# Patient Record
Sex: MALE | Race: WHITE | NOT HISPANIC OR LATINO | Employment: FULL TIME | URBAN - METROPOLITAN AREA
[De-identification: names, ages, dates, MRNs, and addresses within clinical notes are randomized per-mention and may not be internally consistent; named-entity substitution may affect disease eponyms.]

---

## 2018-10-25 RX ORDER — NAPROXEN SODIUM 220 MG
220 TABLET ORAL EVERY 12 HOURS PRN
Status: SHIPPED | COMMUNITY
Start: 2018-10-25 | End: 2023-05-02

## 2018-10-25 ASSESSMENT — MIFFLIN-ST. JEOR
SCORE: 1938.08
SCORE: 1938.08

## 2018-10-29 ENCOUNTER — HOSPITAL ENCOUNTER (INPATIENT)
Dept: MEDSURG UNIT | Facility: CLINIC | Age: 48
Discharge: HOME | End: 2018-10-30
Attending: ORTHOPAEDIC SURGERY | Admitting: ORTHOPAEDIC SURGERY

## 2018-10-29 ENCOUNTER — SURGERY - HEALTHEAST (OUTPATIENT)
Dept: SURGERY | Facility: CLINIC | Age: 48
End: 2018-10-29

## 2018-10-29 ENCOUNTER — ANESTHESIA - HEALTHEAST (OUTPATIENT)
Dept: SURGERY | Facility: CLINIC | Age: 48
End: 2018-10-29

## 2018-10-29 DIAGNOSIS — Z96.611 S/P SHOULDER REPLACEMENT, RIGHT: ICD-10-CM

## 2018-10-29 LAB
ABO/RH(D): NORMAL
ANTIBODY SCREEN: NEGATIVE
ERYTHROCYTE [DISTWIDTH] IN BLOOD BY AUTOMATED COUNT: 12.4 % (ref 11–14.5)
HCT VFR BLD AUTO: 42.5 % (ref 40–54)
HGB BLD-MCNC: 14.8 G/DL (ref 14–18)
MCH RBC QN AUTO: 33.3 PG (ref 27–34)
MCHC RBC AUTO-ENTMCNC: 34.8 G/DL (ref 32–36)
MCV RBC AUTO: 96 FL (ref 80–100)
PLATELET # BLD AUTO: 408 THOU/UL (ref 140–440)
PMV BLD AUTO: 9.8 FL (ref 8.5–12.5)
RBC # BLD AUTO: 4.44 MILL/UL (ref 4.4–6.2)
WBC: 9.7 THOU/UL (ref 4–11)

## 2018-10-29 ASSESSMENT — MIFFLIN-ST. JEOR
SCORE: 1949.14
SCORE: 1949.14

## 2018-10-30 RX ORDER — POLYETHYLENE GLYCOL 3350 17 G/17G
17 POWDER, FOR SOLUTION ORAL DAILY PRN
Refills: 0 | Status: SHIPPED | COMMUNITY
Start: 2018-10-30 | End: 2023-05-02

## 2018-10-30 RX ORDER — OXYCODONE HYDROCHLORIDE 5 MG/1
5-10 TABLET ORAL EVERY 4 HOURS PRN
Qty: 50 TABLET | Refills: 0 | Status: SHIPPED | OUTPATIENT
Start: 2018-10-30 | End: 2023-05-02

## 2018-10-30 RX ORDER — ACETAMINOPHEN 500 MG
1000 TABLET ORAL 3 TIMES DAILY
Refills: 0 | Status: SHIPPED | COMMUNITY
Start: 2018-10-30 | End: 2023-05-02

## 2018-10-30 RX ORDER — HYDROXYZINE PAMOATE 25 MG/1
25 CAPSULE ORAL EVERY 4 HOURS PRN
Qty: 30 CAPSULE | Refills: 0 | Status: SHIPPED | OUTPATIENT
Start: 2018-10-30 | End: 2023-05-02

## 2021-06-02 VITALS
HEIGHT: 71 IN | BODY MASS INDEX: 33.24 KG/M2 | HEIGHT: 71 IN | BODY MASS INDEX: 33.24 KG/M2 | WEIGHT: 237.44 LBS | WEIGHT: 237.44 LBS

## 2021-06-16 PROBLEM — Z96.611 S/P SHOULDER REPLACEMENT, RIGHT: Status: ACTIVE | Noted: 2018-10-29

## 2021-06-21 NOTE — DISCHARGE SUMMARY
Orthopedic Discharge Summary    Jose Ramon GoldALEXIS 1970, MRN [id number]    Admission Date: 10/29/2018  Admission Diagnoses: Osteoarthritis [M19.90]     Discharge Date: 10/30/2018     Post-operative Day:  1 day post op    Reason for Admission: The patient was admitted for the following:  RIGHT TOTAL SHOULDER ARTHROPLASTY (Right)    Brief Hospital Course: This 48 y.o. male underwent the aforementioned procedure with Dr. Mae on 10/29/18. There were no intraoperative complications and the patient was transferred to the recovery room and later the orthopedic unit in stable condition. Once the patient reached the orthopedic floor our orthopedic pain protocol was implemented along with the following:  Therapy: Physical Therapy and Occupational Therapy  Anticoagulation Medications: Aspirin     Complications during admission: None  Consultations during admission: None    Pertinent Results at Discharge:    Hemoglobin   Date/Time Value Ref Range Status   10/29/2018 09:44 AM 14.8 14.0 - 18.0 g/dL Final     Platelets   Date/Time Value Ref Range Status   10/29/2018 09:44  140 - 440 thou/uL Final     Vitals:    10/30/18 0804   BP: 139/88   Pulse: (!) 58   Resp: 16   Temp: 98.5  F (36.9  C)   SpO2: 94%       Principal Problem:    S/P shoulder replacement, right      Discharge Information:  Condition at discharge: good  Discharge destination: Home or Self Care    Medications at discharge:    Jose Ramon   Home Medication Instructions JOE:[id number]    Printed on:18 1934   Medication Information                      acetaminophen (TYLENOL) 500 MG tablet  Take 2 tablets (1,000 mg total) by mouth 3 (three) times a day.             aspirin 325 MG tablet  Take 1 tablet (325 mg total) by mouth daily with supper.             hydrOXYzine pamoate (VISTARIL) 25 MG capsule  Take 1 capsule (25 mg total) by mouth every 4 (four) hours as needed for itching or anxiety (pain, muscle spasms).             naproxen sodium  (ALEVE) 220 MG tablet  Take 220 mg by mouth every 12 (twelve) hours as needed for pain.              oxyCODONE (ROXICODONE) 5 MG immediate release tablet  Take 1-2 tablets (5-10 mg total) by mouth every 4 (four) hours as needed for pain.             polyethylene glycol (MIRALAX) 17 gram packet  Take 1 packet (17 g total) by mouth daily as needed.                 Bracing: Shoulder immobilizer    Activity: NWB FERNE      Follow-up Care:  The patient will be followed in our office in 2 weeks or sooner should the need arise.  Patient should follow up with their PCP as directed.  Patient was seen by myself on the date of discharge.    Fabiola Sesay PA-C  Date: 11/14/2018  Time: 7:34 PM

## 2021-06-21 NOTE — ANESTHESIA PROCEDURE NOTES
Peripheral Block    Patient location during procedure: pre-op  Start time: 10/29/2018 10:14 AM  End time: 10/29/2018 10:16 AM  post-op analgesia per surgeon order as noted in medical record  Staffing:  Performing  Anesthesiologist: BECKIE SMITH  Preanesthetic Checklist  Completed: patient identified, site marked, risks, benefits, and alternatives discussed, timeout performed, consent obtained, airway assessed, oxygen available, suction available, emergency drugs available and hand hygiene performed  Peripheral Block  Block type: other, superficial cervical plexus  Prep: ChloraPrep  Patient position: supine  Patient monitoring: cardiac monitor, continuous pulse oximetry, blood pressure and heart rate  Laterality: right  Injection technique: ultrasound guided    Ultrasound used to visualize needle placement in proximity to nerve being blocked: yes   Permanent ultrasound image captured for medical record  Sterile gel and probe cover used for ultrasound.    Needle  Needle type: echogenic   Needle gauge: 20G  Needle length: 4 in  no peripheral nerve catheter placed  Assessment  Injection assessment: no difficulty with injection, negative aspiration for heme, no paresthesia on injection and incremental injection

## 2021-06-21 NOTE — PROGRESS NOTES
Pharmacy Note - Admission Medication History  Pertinent Provider Information:    ______________________________________________________________________  Prior To Admission (PTA) med list completed and updated in EMR.     PTA Med List   Medication Sig Last Dose     naproxen sodium (ALEVE) 220 MG tablet Take 220 mg by mouth every 12 (twelve) hours as needed for pain.  10/25/2018       Information source(s): Patient  Patient was asked about OTC/herbal products specifically.  PTA med list reflects this.  Based on the pharmacist s assessment, the PTA med list information appears reliable  Allergies were reviewed, assessed, and updated with the patient.    Patient does not use any multi-dose medications prior to admission.   Thank you for the opportunity to participate in the care of this patient.    Chuck Ware, PharmD     10/29/2018     10:04 AM

## 2021-06-21 NOTE — ANESTHESIA PROCEDURE NOTES
Peripheral Block    Patient location during procedure: pre-op  Start time: 10/29/2018 10:08 AM  End time: 10/29/2018 10:13 AM  post-op analgesia per surgeon order as noted in medical record  Staffing:  Performing  Anesthesiologist: BECKIE SMTIH  Preanesthetic Checklist  Completed: patient identified, site marked, risks, benefits, and alternatives discussed, timeout performed, consent obtained, airway assessed, oxygen available, suction available, emergency drugs available and hand hygiene performed  Peripheral Block  Block type: brachial plexus  Prep: ChloraPrep  Patient position: supine  Patient monitoring: cardiac monitor, continuous pulse oximetry, heart rate and blood pressure  Laterality: right  Injection technique: ultrasound guided    Ultrasound used to visualize needle placement in proximity to nerve being blocked: yes   Permanent ultrasound image captured for medical record  Sterile gel and probe cover used for ultrasound.    Needle  Needle type: echogenic   Needle gauge: 20G  Needle length: 4 in  no peripheral nerve catheter placed  Assessment  Injection assessment: no difficulty with injection, negative aspiration for heme, no paresthesia on injection and incremental injection

## 2021-06-21 NOTE — OP NOTE
Operative report:    Preop diagnosis:  Right shoulder degenerative joint disease grade 4    Postop diagnosis:  Same    Procedure:  Right standard total shoulder arthroplasty, Biomet    Surgeon:  Live Mae MD    First assistant:  Jose Kidd PA-C was medically necessary to assist with patient positioning, bone and soft tissue retraction as well as instrumentation during the right shoulder procedure, wound closure, patient safety.    Procedure:  After adequate regional and general anesthesia had been obtained, and the patient had received IV antibiotics, the patient's right shoulder and upper extremity were sterilely prepped and draped in the routine fashion.  He was in the semi-beachchair sitting position.  A deltopectoral incision was made and the cephalic vein was retracted medially.  The rotator cuff is intact throughout.  There is a type II acromion that did not feel like it was impinging significantly.  No abrasion was noted on the superior rotator cuff tissues.  The biceps tendon was intact.  The conjoined tendon group was retracted medially.  The axillary nerve is palpably intact at the initiation of conclusion of the procedure.  The subscapularis was released at the junction of the articular surface and tagged for later repair.  The articular wear changes are grade 4 on both the humeral head and glenoid side.  The humeral head was reamed and broached to accept a size 15 x 83 mm humeral stem, press-fit, and 25 degrees retro-torsion.  The last broach was left in place to protect the proximal humerus during glenoid preparation.  The humeral head had been cut with the humeral head cutting guide in 25 degrees retro-torsion, removing approximately 1 tablespoon of bone.    The glenoid was prepared to accept a size medium hybrid glenoid component with the press-fit titanium central regenerate X post.  Trial reduction fit well after reaming and preparing in the standard fashion.  He did  "have some posterior glenoid wear and with the ream and run technique of reaming down a portion of the high side there was good bone support for 90% of the glenoid component.  The actual medium glenoid component with the central titanium post was impacted into place with rigid press-fit fixation of the posterior, and cement fixation of the 3 small peripheral plastic pegs.  Trial reduction with the humeral stem and a 50 mm x 24 mm modular humeral head demonstrated good soft tissue tension and through a full range of motion.  Because of some capsular laxity in the posterior capsular recess I did use #2 FiberWire sutures to reef the posterior capsule prior to implanting the final stem.  This was done after the trials have been removed.    The bony surfaces were thoroughly lavaged and dried and the humeral stem of the aforementioned size was impacted into place and a 25 degrees retro-torsion with rigid press-fit fixation to the same level as the prior broach.  The humeral head of the aforementioned size was then impacted into place with the offset at the \"D level\", with the offset placed posteriorly to avoid any impingement under the biceps tendon and superior rotator cuff.  Wound was thoroughly irrigated.  The subscapularis was repaired anatomically with #2 FiberWire.  Could externally rotate to 30 degrees without excessive tension on the repair.  I could also elevate in the scapular plane to 170 degrees and internally and externally rotate without instability.    The deltopectoral interval was allowed to reapproximate the subcutaneous tissues were closed with 2-0 Vicryl and the skin with running subcuticular 3-0 Monocryl and Dermabond.  Sterile dressing applied.  Super sling applied.  Tolerated well.    Estimated blood loss: 150 cc  Complications: None  Specimens: None  Drains: None    Live Mae MD  "

## 2021-06-21 NOTE — ANESTHESIA POSTPROCEDURE EVALUATION
Patient: Jose Ramon Gold  RIGHT TOTAL SHOULDER ARTHROPLASTY  Anesthesia type: general    Patient location: PACU  Last vitals:   Vitals:    10/29/18 1400   BP: 131/84   Pulse: 64   Resp: 15   Temp:    SpO2: 97%     Post vital signs: stable  Level of consciousness: awake, alert and oriented  Post-anesthesia pain: pain controlled  Post-anesthesia nausea and vomiting: no  Pulmonary: unassisted, nasal cannula  Cardiovascular: stable and blood pressure at baseline  Hydration: adequate  Anesthetic events: no    QCDR Measures:  ASA# 11 - Candice-op Cardiac Arrest: ASA11B - Patient did NOT experience unanticipated cardiac arrest  ASA# 12 - Candice-op Mortality Rate: ASA12B - Patient did NOT die  ASA# 13 - PACU Re-Intubation Rate: ASA13B - Patient did NOT require a new airway mgmt  ASA# 10 - Composite Anes Safety: ASA10A - No serious adverse event    Additional Notes:

## 2021-06-21 NOTE — ANESTHESIA CARE TRANSFER NOTE
Last vitals:   Vitals:    10/29/18 1347   BP: 150/98   Pulse:   (P) 68   Resp: (P) 12   Temp: (P) 36.9  C (98.5  F)   SpO2: (P) 96%     Patient's level of consciousness is drowsy  Spontaneous respirations: yes  Maintains airway independently: yes  Dentition unchanged: yes  Oropharynx: oropharynx clear of all foreign objects    QCDR Measures:  ASA# 20 - Surgical Safety Checklist: WHO surgical safety checklist completed prior to induction  PQRS# 430 - Adult PONV Prevention: 4558F - Pt received => 2 anti-emetic agents (different classes) preop & intraop  ASA# 8 - Peds PONV Prevention: NA - Not pediatric patient, not GA or 2 or more risk factors NOT present  PQRS# 424 - Candice-op Temp Management: 4559F - At least one body temp DOCUMENTED => 35.5C or 95.9F within required timeframe  PQRS# 426 - PACU Transfer Protocol: - Transfer of care checklist used  ASA# 14 - Acute Post-op Pain: ASA14B - Patient did NOT experience pain >= 7 out of 10

## 2021-06-21 NOTE — PROGRESS NOTES
Orthopedic Progress Note      Post-operative Day: 1 Day Post-Op  S/P RIGHT TOTAL SHOULDER ARTHROPLASTY      Subjective:  Pain: mild  Nausea, Vomiting:  No  Lightheadedness, Dizziness:  No  Neuro:  Patient denies new onset numbness or paresthesias    Patient is doing well today. Sensation has returned to the hand, but anesthetic block is still in effect and pain is minimal. Ambulating well. No complaints.     Objective:  Vitals:    10/30/18 0804   BP: 139/88   Pulse: (!) 58   Resp: 16   Temp: 98.5  F (36.9  C)   SpO2: 94%     The patient is A&Ox3. Appears comfortable.   Sensation is intact.  Wrist ROM and  strength is intact.  Radial pulse intact.  The incision is covered. Dressing C/D/I. Sling in place.       Pertinent Labs   Lab Results: personally reviewed.   No results found for: INR, PROTIME  Lab Results   Component Value Date    WBC 9.7 10/29/2018    HGB 14.8 10/29/2018    HCT 42.5 10/29/2018    MCV 96 10/29/2018     10/29/2018     No results found for: NA, K, CL, CO2    Assessment: POD #1 s/p right TSA by Dr. Mae on 10/29/18    Plan:   - Continue PT/OT  - Weightbearing status: NWB RLTORY. Sling full time.   - Anticoagulation:  PO QDAY in addition to SCDs, belle stockings and early ambulation.  - Discharge planning: Home today    Report completed by:  Fabiola Sesay PA-C  Date: 10/30/2018  Time: 12:27 PM

## 2021-06-21 NOTE — ANESTHESIA PREPROCEDURE EVALUATION
Anesthesia Evaluation      Patient summary reviewed   No history of anesthetic complications     Airway   Mallampati: I  Neck ROM: full   Pulmonary - normal exam    breath sounds clear to auscultation  (-) not a smoker (Former)                         Cardiovascular - negative ROS and normal exam  Exercise tolerance: > or = 4 METS  (-) murmur  Rhythm: regular  Rate: normal,    no murmur      Neuro/Psych - negative ROS     Endo/Other    (+) arthritis, obesity (BMI 33),      GI/Hepatic/Renal - negative ROS           Dental - normal exam                        Anesthesia Plan  Planned anesthetic: general endotracheal  Right interscalene nerve block and right superficial cervical plexus block for post operative pain control    Decadron 10 mg IV  Zofran  ASA 2   Induction: intravenous   Anesthetic plan and risks discussed with: patient  Anesthesia plan special considerations: antiemetics,   Post-op plan: routine recovery

## 2023-05-02 ENCOUNTER — APPOINTMENT (OUTPATIENT)
Dept: MRI IMAGING | Facility: CLINIC | Age: 53
DRG: 872 | End: 2023-05-02
Attending: FAMILY MEDICINE
Payer: OTHER GOVERNMENT

## 2023-05-02 ENCOUNTER — HOSPITAL ENCOUNTER (INPATIENT)
Facility: CLINIC | Age: 53
LOS: 5 days | Discharge: HOME | DRG: 872 | End: 2023-05-07
Attending: FAMILY MEDICINE | Admitting: INTERNAL MEDICINE
Payer: OTHER GOVERNMENT

## 2023-05-02 ENCOUNTER — APPOINTMENT (OUTPATIENT)
Dept: CT IMAGING | Facility: CLINIC | Age: 53
DRG: 872 | End: 2023-05-02
Attending: FAMILY MEDICINE
Payer: OTHER GOVERNMENT

## 2023-05-02 DIAGNOSIS — Z90.81 H/O SPLENECTOMY: ICD-10-CM

## 2023-05-02 DIAGNOSIS — G44.209 TENSION HEADACHE: ICD-10-CM

## 2023-05-02 DIAGNOSIS — R65.20 SEPSIS WITH ACUTE RENAL FAILURE WITHOUT SEPTIC SHOCK, DUE TO UNSPECIFIED ORGANISM, UNSPECIFIED ACUTE RENAL FAILURE TYPE (H): ICD-10-CM

## 2023-05-02 DIAGNOSIS — B02.9 HERPES ZOSTER WITHOUT COMPLICATION: Primary | ICD-10-CM

## 2023-05-02 DIAGNOSIS — A41.9 SEPSIS WITH ACUTE RENAL FAILURE WITHOUT SEPTIC SHOCK, DUE TO UNSPECIFIED ORGANISM, UNSPECIFIED ACUTE RENAL FAILURE TYPE (H): ICD-10-CM

## 2023-05-02 DIAGNOSIS — N17.9 SEPSIS WITH ACUTE RENAL FAILURE WITHOUT SEPTIC SHOCK, DUE TO UNSPECIFIED ORGANISM, UNSPECIFIED ACUTE RENAL FAILURE TYPE (H): ICD-10-CM

## 2023-05-02 LAB
ALBUMIN SERPL-MCNC: 3.5 G/DL (ref 3.5–5)
ALP SERPL-CCNC: 83 U/L (ref 45–120)
ALT SERPL W P-5'-P-CCNC: 47 U/L (ref 0–45)
ANION GAP SERPL CALCULATED.3IONS-SCNC: 15 MMOL/L (ref 5–18)
AST SERPL W P-5'-P-CCNC: 42 U/L (ref 0–40)
ATRIAL RATE - MUSE: 106 BPM
BASOPHILS # BLD AUTO: 0.1 10E3/UL (ref 0–0.2)
BASOPHILS NFR BLD AUTO: 0 %
BILIRUB DIRECT SERPL-MCNC: 0.4 MG/DL
BILIRUB SERPL-MCNC: 1.2 MG/DL (ref 0–1)
BUN SERPL-MCNC: 31 MG/DL (ref 8–22)
CALCIUM SERPL-MCNC: 9.2 MG/DL (ref 8.5–10.5)
CHLORIDE BLD-SCNC: 98 MMOL/L (ref 98–107)
CK SERPL-CCNC: 22 U/L (ref 30–190)
CO2 SERPL-SCNC: 19 MMOL/L (ref 22–31)
CREAT SERPL-MCNC: 1.5 MG/DL (ref 0.7–1.3)
DIASTOLIC BLOOD PRESSURE - MUSE: NORMAL MMHG
EOSINOPHIL # BLD AUTO: 0 10E3/UL (ref 0–0.7)
EOSINOPHIL NFR BLD AUTO: 0 %
ERYTHROCYTE [DISTWIDTH] IN BLOOD BY AUTOMATED COUNT: 13 % (ref 10–15)
FLUAV RNA SPEC QL NAA+PROBE: NEGATIVE
FLUBV RNA RESP QL NAA+PROBE: NEGATIVE
GFR SERPL CREATININE-BSD FRML MDRD: 55 ML/MIN/1.73M2
GLUCOSE BLD-MCNC: 105 MG/DL (ref 70–125)
HCT VFR BLD AUTO: 42.3 % (ref 40–53)
HGB BLD-MCNC: 15 G/DL (ref 13.3–17.7)
HOLD SPECIMEN: NORMAL
HOLD SPECIMEN: NORMAL
IMM GRANULOCYTES # BLD: 0.2 10E3/UL
IMM GRANULOCYTES NFR BLD: 1 %
INTERPRETATION ECG - MUSE: NORMAL
LACTATE SERPL-SCNC: 1.9 MMOL/L (ref 0.7–2)
LACTATE SERPL-SCNC: 2.6 MMOL/L (ref 0.7–2)
LIPASE SERPL-CCNC: 13 U/L (ref 0–52)
LYMPHOCYTES # BLD AUTO: 0.9 10E3/UL (ref 0.8–5.3)
LYMPHOCYTES NFR BLD AUTO: 4 %
MAGNESIUM SERPL-MCNC: 1.7 MG/DL (ref 1.8–2.6)
MCH RBC QN AUTO: 33.2 PG (ref 26.5–33)
MCHC RBC AUTO-ENTMCNC: 35.5 G/DL (ref 31.5–36.5)
MCV RBC AUTO: 94 FL (ref 78–100)
MONOCYTES # BLD AUTO: 0.5 10E3/UL (ref 0–1.3)
MONOCYTES NFR BLD AUTO: 2 %
MONOCYTES NFR BLD AUTO: NEGATIVE %
NEUTROPHILS # BLD AUTO: 18.7 10E3/UL (ref 1.6–8.3)
NEUTROPHILS NFR BLD AUTO: 93 %
NRBC # BLD AUTO: 0 10E3/UL
NRBC BLD AUTO-RTO: 0 /100
P AXIS - MUSE: 20 DEGREES
PLATELET # BLD AUTO: 202 10E3/UL (ref 150–450)
POTASSIUM BLD-SCNC: 4.3 MMOL/L (ref 3.5–5)
PR INTERVAL - MUSE: 142 MS
PROCALCITONIN SERPL-MCNC: 10.74 NG/ML (ref 0–0.49)
PROT SERPL-MCNC: 8 G/DL (ref 6–8)
QRS DURATION - MUSE: 78 MS
QT - MUSE: 346 MS
QTC - MUSE: 459 MS
R AXIS - MUSE: 9 DEGREES
RBC # BLD AUTO: 4.52 10E6/UL (ref 4.4–5.9)
RSV RNA SPEC NAA+PROBE: NEGATIVE
SARS-COV-2 RNA RESP QL NAA+PROBE: NEGATIVE
SODIUM SERPL-SCNC: 132 MMOL/L (ref 136–145)
SYSTOLIC BLOOD PRESSURE - MUSE: NORMAL MMHG
T AXIS - MUSE: 16 DEGREES
VENTRICULAR RATE- MUSE: 106 BPM
WBC # BLD AUTO: 20.3 10E3/UL (ref 4–11)

## 2023-05-02 PROCEDURE — 99292 CRITICAL CARE ADDL 30 MIN: CPT | Mod: CS

## 2023-05-02 PROCEDURE — 86308 HETEROPHILE ANTIBODY SCREEN: CPT | Performed by: INTERNAL MEDICINE

## 2023-05-02 PROCEDURE — 250N000013 HC RX MED GY IP 250 OP 250 PS 637: Performed by: FAMILY MEDICINE

## 2023-05-02 PROCEDURE — 250N000011 HC RX IP 250 OP 636: Performed by: FAMILY MEDICINE

## 2023-05-02 PROCEDURE — 72157 MRI CHEST SPINE W/O & W/DYE: CPT

## 2023-05-02 PROCEDURE — 74177 CT ABD & PELVIS W/CONTRAST: CPT

## 2023-05-02 PROCEDURE — 83735 ASSAY OF MAGNESIUM: CPT | Performed by: FAMILY MEDICINE

## 2023-05-02 PROCEDURE — 87476 LYME DIS DNA AMP PROBE: CPT | Performed by: INTERNAL MEDICINE

## 2023-05-02 PROCEDURE — 99291 CRITICAL CARE FIRST HOUR: CPT | Mod: 25,CS

## 2023-05-02 PROCEDURE — 96361 HYDRATE IV INFUSION ADD-ON: CPT

## 2023-05-02 PROCEDURE — 80053 COMPREHEN METABOLIC PANEL: CPT | Performed by: FAMILY MEDICINE

## 2023-05-02 PROCEDURE — 93005 ELECTROCARDIOGRAM TRACING: CPT | Performed by: FAMILY MEDICINE

## 2023-05-02 PROCEDURE — 87149 DNA/RNA DIRECT PROBE: CPT | Performed by: FAMILY MEDICINE

## 2023-05-02 PROCEDURE — 72158 MRI LUMBAR SPINE W/O & W/DYE: CPT

## 2023-05-02 PROCEDURE — 83690 ASSAY OF LIPASE: CPT | Performed by: FAMILY MEDICINE

## 2023-05-02 PROCEDURE — 82550 ASSAY OF CK (CPK): CPT | Performed by: INTERNAL MEDICINE

## 2023-05-02 PROCEDURE — 82248 BILIRUBIN DIRECT: CPT | Performed by: FAMILY MEDICINE

## 2023-05-02 PROCEDURE — 36415 COLL VENOUS BLD VENIPUNCTURE: CPT | Performed by: FAMILY MEDICINE

## 2023-05-02 PROCEDURE — 96365 THER/PROPH/DIAG IV INF INIT: CPT

## 2023-05-02 PROCEDURE — 87798 DETECT AGENT NOS DNA AMP: CPT | Performed by: INTERNAL MEDICINE

## 2023-05-02 PROCEDURE — 85025 COMPLETE CBC W/AUTO DIFF WBC: CPT | Performed by: FAMILY MEDICINE

## 2023-05-02 PROCEDURE — 87077 CULTURE AEROBIC IDENTIFY: CPT | Performed by: FAMILY MEDICINE

## 2023-05-02 PROCEDURE — 99223 1ST HOSP IP/OBS HIGH 75: CPT | Performed by: INTERNAL MEDICINE

## 2023-05-02 PROCEDURE — 87637 SARSCOV2&INF A&B&RSV AMP PRB: CPT | Performed by: FAMILY MEDICINE

## 2023-05-02 PROCEDURE — 87468 ANAPLSMA PHGCYTOPHLM AMP PRB: CPT | Performed by: INTERNAL MEDICINE

## 2023-05-02 PROCEDURE — 120N000001 HC R&B MED SURG/OB

## 2023-05-02 PROCEDURE — 96375 TX/PRO/DX INJ NEW DRUG ADDON: CPT

## 2023-05-02 PROCEDURE — 87207 SMEAR SPECIAL STAIN: CPT | Performed by: INTERNAL MEDICINE

## 2023-05-02 PROCEDURE — 258N000003 HC RX IP 258 OP 636: Performed by: FAMILY MEDICINE

## 2023-05-02 PROCEDURE — 250N000013 HC RX MED GY IP 250 OP 250 PS 637: Performed by: INTERNAL MEDICINE

## 2023-05-02 PROCEDURE — 83605 ASSAY OF LACTIC ACID: CPT | Performed by: FAMILY MEDICINE

## 2023-05-02 PROCEDURE — 84145 PROCALCITONIN (PCT): CPT | Performed by: INTERNAL MEDICINE

## 2023-05-02 RX ORDER — PIPERACILLIN SODIUM, TAZOBACTAM SODIUM 3; .375 G/15ML; G/15ML
3.38 INJECTION, POWDER, LYOPHILIZED, FOR SOLUTION INTRAVENOUS EVERY 8 HOURS
Status: DISCONTINUED | OUTPATIENT
Start: 2023-05-03 | End: 2023-05-03

## 2023-05-02 RX ORDER — FLUTICASONE PROPIONATE 50 MCG
2 SPRAY, SUSPENSION (ML) NASAL DAILY
COMMUNITY
Start: 2023-02-06

## 2023-05-02 RX ORDER — CETIRIZINE HYDROCHLORIDE 10 MG/1
10 TABLET ORAL DAILY
COMMUNITY
Start: 2022-10-10

## 2023-05-02 RX ORDER — ACETAMINOPHEN 650 MG/1
650 SUPPOSITORY RECTAL EVERY 6 HOURS PRN
Status: DISCONTINUED | OUTPATIENT
Start: 2023-05-02 | End: 2023-05-07 | Stop reason: HOSPADM

## 2023-05-02 RX ORDER — HYDROMORPHONE HYDROCHLORIDE 1 MG/ML
0.3 INJECTION, SOLUTION INTRAMUSCULAR; INTRAVENOUS; SUBCUTANEOUS EVERY 4 HOURS PRN
Status: DISCONTINUED | OUTPATIENT
Start: 2023-05-02 | End: 2023-05-07

## 2023-05-02 RX ORDER — HYDROCODONE BITARTRATE AND ACETAMINOPHEN 5; 325 MG/1; MG/1
1-2 TABLET ORAL EVERY 4 HOURS PRN
Status: DISCONTINUED | OUTPATIENT
Start: 2023-05-02 | End: 2023-05-07 | Stop reason: HOSPADM

## 2023-05-02 RX ORDER — ACETAMINOPHEN 325 MG/1
650 TABLET ORAL EVERY 4 HOURS PRN
Status: DISCONTINUED | OUTPATIENT
Start: 2023-05-02 | End: 2023-05-07 | Stop reason: HOSPADM

## 2023-05-02 RX ORDER — SODIUM CHLORIDE 9 MG/ML
INJECTION, SOLUTION INTRAVENOUS CONTINUOUS
Status: DISCONTINUED | OUTPATIENT
Start: 2023-05-02 | End: 2023-05-04

## 2023-05-02 RX ORDER — GADOBUTROL 604.72 MG/ML
10 INJECTION INTRAVENOUS ONCE
Status: COMPLETED | OUTPATIENT
Start: 2023-05-03 | End: 2023-05-03

## 2023-05-02 RX ORDER — LORAZEPAM 2 MG/ML
0.5 INJECTION INTRAMUSCULAR ONCE
Status: COMPLETED | OUTPATIENT
Start: 2023-05-02 | End: 2023-05-02

## 2023-05-02 RX ORDER — LOSARTAN POTASSIUM AND HYDROCHLOROTHIAZIDE 12.5; 1 MG/1; MG/1
1 TABLET ORAL DAILY
COMMUNITY
Start: 2023-03-30

## 2023-05-02 RX ORDER — KETOROLAC TROMETHAMINE 15 MG/ML
15 INJECTION, SOLUTION INTRAMUSCULAR; INTRAVENOUS ONCE
Status: COMPLETED | OUTPATIENT
Start: 2023-05-02 | End: 2023-05-02

## 2023-05-02 RX ORDER — IOPAMIDOL 755 MG/ML
90 INJECTION, SOLUTION INTRAVASCULAR ONCE
Status: COMPLETED | OUTPATIENT
Start: 2023-05-02 | End: 2023-05-02

## 2023-05-02 RX ORDER — LIDOCAINE 40 MG/G
CREAM TOPICAL
Status: DISCONTINUED | OUTPATIENT
Start: 2023-05-02 | End: 2023-05-07 | Stop reason: HOSPADM

## 2023-05-02 RX ORDER — ACETAMINOPHEN 325 MG/1
650 TABLET ORAL ONCE
Status: COMPLETED | OUTPATIENT
Start: 2023-05-02 | End: 2023-05-02

## 2023-05-02 RX ORDER — ZINC GLUCONATE 50 MG
50 TABLET ORAL DAILY
COMMUNITY

## 2023-05-02 RX ORDER — PIPERACILLIN SODIUM, TAZOBACTAM SODIUM 3; .375 G/15ML; G/15ML
3.38 INJECTION, POWDER, LYOPHILIZED, FOR SOLUTION INTRAVENOUS ONCE
Status: COMPLETED | OUTPATIENT
Start: 2023-05-02 | End: 2023-05-02

## 2023-05-02 RX ORDER — MULTIVITAMIN,THERAPEUTIC
1 TABLET ORAL DAILY
COMMUNITY

## 2023-05-02 RX ADMIN — LORAZEPAM 0.5 MG: 2 INJECTION INTRAMUSCULAR; INTRAVENOUS at 23:20

## 2023-05-02 RX ADMIN — HYDROCODONE BITARTRATE AND ACETAMINOPHEN 1 TABLET: 5; 325 TABLET ORAL at 22:51

## 2023-05-02 RX ADMIN — VANCOMYCIN HYDROCHLORIDE 2000 MG: 5 INJECTION, POWDER, LYOPHILIZED, FOR SOLUTION INTRAVENOUS at 22:09

## 2023-05-02 RX ADMIN — PIPERACILLIN AND TAZOBACTAM 3.38 G: 3; .375 INJECTION, POWDER, LYOPHILIZED, FOR SOLUTION INTRAVENOUS at 21:31

## 2023-05-02 RX ADMIN — IOPAMIDOL 90 ML: 755 INJECTION, SOLUTION INTRAVENOUS at 20:29

## 2023-05-02 RX ADMIN — KETOROLAC TROMETHAMINE 15 MG: 15 INJECTION, SOLUTION INTRAMUSCULAR; INTRAVENOUS at 19:19

## 2023-05-02 RX ADMIN — ACETAMINOPHEN 650 MG: 325 TABLET ORAL at 21:41

## 2023-05-02 RX ADMIN — SODIUM CHLORIDE 1000 ML: 9 INJECTION, SOLUTION INTRAVENOUS at 19:19

## 2023-05-02 RX ADMIN — SODIUM CHLORIDE 1000 ML: 9 INJECTION, SOLUTION INTRAVENOUS at 20:08

## 2023-05-02 ASSESSMENT — ENCOUNTER SYMPTOMS
ABDOMINAL PAIN: 1
APPETITE CHANGE: 1
HEADACHES: 1
FEVER: 1
COUGH: 0
BACK PAIN: 1
CHILLS: 1
DIARRHEA: 1

## 2023-05-02 ASSESSMENT — ACTIVITIES OF DAILY LIVING (ADL)
ADLS_ACUITY_SCORE: 35
ADLS_ACUITY_SCORE: 35

## 2023-05-02 NOTE — ED TRIAGE NOTES
The patient presents to the ED with a fever and severe lower back pain that has been present since Saturday evening. The patient went to urgent care today and they sent him here for WBC of 22. The patient reports severe chills. He reports motrin and tylenol have not helped at home. Had a UA done at urgent care as well and found some blood in the urine as reported by the patient.   He states he's feeling short of breath.    
Home

## 2023-05-03 ENCOUNTER — APPOINTMENT (OUTPATIENT)
Dept: RADIOLOGY | Facility: CLINIC | Age: 53
DRG: 872 | End: 2023-05-03
Attending: HOSPITALIST
Payer: OTHER GOVERNMENT

## 2023-05-03 ENCOUNTER — APPOINTMENT (OUTPATIENT)
Dept: ULTRASOUND IMAGING | Facility: CLINIC | Age: 53
DRG: 872 | End: 2023-05-03
Payer: OTHER GOVERNMENT

## 2023-05-03 ENCOUNTER — APPOINTMENT (OUTPATIENT)
Dept: MRI IMAGING | Facility: CLINIC | Age: 53
DRG: 872 | End: 2023-05-03
Payer: OTHER GOVERNMENT

## 2023-05-03 ENCOUNTER — APPOINTMENT (OUTPATIENT)
Dept: CARDIOLOGY | Facility: CLINIC | Age: 53
DRG: 872 | End: 2023-05-03
Attending: HOSPITALIST
Payer: OTHER GOVERNMENT

## 2023-05-03 ENCOUNTER — APPOINTMENT (OUTPATIENT)
Dept: CT IMAGING | Facility: CLINIC | Age: 53
DRG: 872 | End: 2023-05-03
Attending: HOSPITALIST
Payer: OTHER GOVERNMENT

## 2023-05-03 PROBLEM — I10 HTN (HYPERTENSION): Status: ACTIVE | Noted: 2022-10-10

## 2023-05-03 PROBLEM — A41.9 SEPSIS (H): Status: ACTIVE | Noted: 2023-05-03

## 2023-05-03 PROBLEM — E83.42 HYPOMAGNESEMIA: Status: ACTIVE | Noted: 2023-05-03

## 2023-05-03 PROBLEM — N17.9 AKI (ACUTE KIDNEY INJURY) (H): Status: ACTIVE | Noted: 2023-05-03

## 2023-05-03 LAB
ACINETOBACTER SPECIES: NOT DETECTED
ALBUMIN SERPL-MCNC: 2.8 G/DL (ref 3.5–5)
ALBUMIN UR-MCNC: 10 MG/DL
ALP SERPL-CCNC: 64 U/L (ref 45–120)
ALT SERPL W P-5'-P-CCNC: 40 U/L (ref 0–45)
ANAPLASMA BLD MOD GIEMSA: NEGATIVE
ANION GAP SERPL CALCULATED.3IONS-SCNC: 5 MMOL/L (ref 5–18)
APPEARANCE UR: CLEAR
AST SERPL W P-5'-P-CCNC: 32 U/L (ref 0–40)
B MICROTI BLD SMEAR: NEGATIVE
BASOPHILS # BLD AUTO: 0.1 10E3/UL (ref 0–0.2)
BASOPHILS NFR BLD AUTO: 0 %
BILIRUB SERPL-MCNC: 1 MG/DL (ref 0–1)
BILIRUB UR QL STRIP: NEGATIVE
BUN SERPL-MCNC: 26 MG/DL (ref 8–22)
C DIFF TOX B STL QL: NEGATIVE
C REACTIVE PROTEIN LHE: 8.3 MG/DL (ref 0–?)
CALCIUM SERPL-MCNC: 8.3 MG/DL (ref 8.5–10.5)
CHLORIDE BLD-SCNC: 109 MMOL/L (ref 98–107)
CITROBACTER SPECIES: NOT DETECTED
CO2 SERPL-SCNC: 22 MMOL/L (ref 22–31)
COLOR UR AUTO: ABNORMAL
CREAT SERPL-MCNC: 1.23 MG/DL (ref 0.7–1.3)
CTX-M: NORMAL
EHRLICHIA SPEC QL MICRO: NEGATIVE
ENTEROBACTER SPECIES: NOT DETECTED
EOSINOPHIL # BLD AUTO: 0 10E3/UL (ref 0–0.7)
EOSINOPHIL NFR BLD AUTO: 0 %
ERYTHROCYTE [DISTWIDTH] IN BLOOD BY AUTOMATED COUNT: 13.6 % (ref 10–15)
ERYTHROCYTE [SEDIMENTATION RATE] IN BLOOD BY WESTERGREN METHOD: 26 MM/HR (ref 0–20)
ESCHERICHIA COLI: NOT DETECTED
GFR SERPL CREATININE-BSD FRML MDRD: 70 ML/MIN/1.73M2
GLUCOSE BLD-MCNC: 98 MG/DL (ref 70–125)
GLUCOSE UR STRIP-MCNC: NEGATIVE MG/DL
HCT VFR BLD AUTO: 36.8 % (ref 40–53)
HGB BLD-MCNC: 12.8 G/DL (ref 13.3–17.7)
HGB UR QL STRIP: ABNORMAL
IMM GRANULOCYTES # BLD: 0.1 10E3/UL
IMM GRANULOCYTES NFR BLD: 1 %
IMP: NORMAL
KETONES UR STRIP-MCNC: NEGATIVE MG/DL
KLEBSIELLA OXYTOCA: NOT DETECTED
KLEBSIELLA PNEUMONIAE: NOT DETECTED
KPC: NORMAL
LEUKOCYTE ESTERASE UR QL STRIP: NEGATIVE
LVEF ECHO: NORMAL
LYMPHOCYTES # BLD AUTO: 2.9 10E3/UL (ref 0.8–5.3)
LYMPHOCYTES NFR BLD AUTO: 14 %
MAGNESIUM SERPL-MCNC: 2 MG/DL (ref 1.8–2.6)
MCH RBC QN AUTO: 33.4 PG (ref 26.5–33)
MCHC RBC AUTO-ENTMCNC: 34.8 G/DL (ref 31.5–36.5)
MCV RBC AUTO: 96 FL (ref 78–100)
MONOCYTES # BLD AUTO: 1.3 10E3/UL (ref 0–1.3)
MONOCYTES NFR BLD AUTO: 6 %
NDM: NORMAL
NEUTROPHILS # BLD AUTO: 16.1 10E3/UL (ref 1.6–8.3)
NEUTROPHILS NFR BLD AUTO: 79 %
NITRATE UR QL: NEGATIVE
NRBC # BLD AUTO: 0 10E3/UL
NRBC BLD AUTO-RTO: 0 /100
OXA (DETECTED/NOT DETECTED): NORMAL
PH UR STRIP: 6.5 [PH] (ref 5–7)
PLATELET # BLD AUTO: 150 10E3/UL (ref 150–450)
POTASSIUM BLD-SCNC: 4.2 MMOL/L (ref 3.5–5)
PROT SERPL-MCNC: 6.4 G/DL (ref 6–8)
PROTEUS SPECIES: NOT DETECTED
PSEUDOMONAS AERUGINOSA: NOT DETECTED
RBC # BLD AUTO: 3.83 10E6/UL (ref 4.4–5.9)
RBC URINE: 6 /HPF
SODIUM SERPL-SCNC: 136 MMOL/L (ref 136–145)
SP GR UR STRIP: 1.02 (ref 1–1.03)
SQUAMOUS EPITHELIAL: <1 /HPF
UROBILINOGEN UR STRIP-MCNC: <2 MG/DL
VIM: NORMAL
WBC # BLD AUTO: 20.5 10E3/UL (ref 4–11)
WBC URINE: 1 /HPF

## 2023-05-03 PROCEDURE — 73610 X-RAY EXAM OF ANKLE: CPT | Mod: RT

## 2023-05-03 PROCEDURE — 89050 BODY FLUID CELL COUNT: CPT

## 2023-05-03 PROCEDURE — 99222 1ST HOSP IP/OBS MODERATE 55: CPT | Performed by: INTERNAL MEDICINE

## 2023-05-03 PROCEDURE — 86140 C-REACTIVE PROTEIN: CPT

## 2023-05-03 PROCEDURE — 36415 COLL VENOUS BLD VENIPUNCTURE: CPT | Performed by: INTERNAL MEDICINE

## 2023-05-03 PROCEDURE — 255N000002 HC RX 255 OP 636: Performed by: INTERNAL MEDICINE

## 2023-05-03 PROCEDURE — 250N000011 HC RX IP 250 OP 636: Performed by: FAMILY MEDICINE

## 2023-05-03 PROCEDURE — 73030 X-RAY EXAM OF SHOULDER: CPT | Mod: RT

## 2023-05-03 PROCEDURE — 250N000011 HC RX IP 250 OP 636: Performed by: HOSPITALIST

## 2023-05-03 PROCEDURE — 272N000710 US JOINT INJECTION ASPIRATION MAJOR RIGHT

## 2023-05-03 PROCEDURE — 120N000001 HC R&B MED SURG/OB

## 2023-05-03 PROCEDURE — 70450 CT HEAD/BRAIN W/O DYE: CPT

## 2023-05-03 PROCEDURE — 76882 US LMTD JT/FCL EVL NVASC XTR: CPT | Mod: RT

## 2023-05-03 PROCEDURE — 80053 COMPREHEN METABOLIC PANEL: CPT | Performed by: INTERNAL MEDICINE

## 2023-05-03 PROCEDURE — 87493 C DIFF AMPLIFIED PROBE: CPT | Performed by: INTERNAL MEDICINE

## 2023-05-03 PROCEDURE — 255N000002 HC RX 255 OP 636: Performed by: HOSPITALIST

## 2023-05-03 PROCEDURE — 250N000011 HC RX IP 250 OP 636: Performed by: INTERNAL MEDICINE

## 2023-05-03 PROCEDURE — 0R9J3ZZ DRAINAGE OF RIGHT SHOULDER JOINT, PERCUTANEOUS APPROACH: ICD-10-PCS

## 2023-05-03 PROCEDURE — 258N000003 HC RX IP 258 OP 636: Performed by: INTERNAL MEDICINE

## 2023-05-03 PROCEDURE — 83735 ASSAY OF MAGNESIUM: CPT | Performed by: INTERNAL MEDICINE

## 2023-05-03 PROCEDURE — 85025 COMPLETE CBC W/AUTO DIFF WBC: CPT | Performed by: INTERNAL MEDICINE

## 2023-05-03 PROCEDURE — 258N000003 HC RX IP 258 OP 636: Performed by: HOSPITALIST

## 2023-05-03 PROCEDURE — A9585 GADOBUTROL INJECTION: HCPCS | Performed by: INTERNAL MEDICINE

## 2023-05-03 PROCEDURE — 85652 RBC SED RATE AUTOMATED: CPT

## 2023-05-03 PROCEDURE — 93306 TTE W/DOPPLER COMPLETE: CPT | Mod: 26 | Performed by: GENERAL ACUTE CARE HOSPITAL

## 2023-05-03 PROCEDURE — 36415 COLL VENOUS BLD VENIPUNCTURE: CPT

## 2023-05-03 PROCEDURE — 99232 SBSQ HOSP IP/OBS MODERATE 35: CPT | Performed by: HOSPITALIST

## 2023-05-03 PROCEDURE — 73223 MRI JOINT UPR EXTR W/O&W/DYE: CPT | Mod: RT

## 2023-05-03 PROCEDURE — 81001 URINALYSIS AUTO W/SCOPE: CPT | Performed by: HOSPITALIST

## 2023-05-03 RX ORDER — NALOXONE HYDROCHLORIDE 0.4 MG/ML
0.2 INJECTION, SOLUTION INTRAMUSCULAR; INTRAVENOUS; SUBCUTANEOUS
Status: DISCONTINUED | OUTPATIENT
Start: 2023-05-03 | End: 2023-05-07 | Stop reason: HOSPADM

## 2023-05-03 RX ORDER — AMPICILLIN AND SULBACTAM 2; 1 G/1; G/1
3 INJECTION, POWDER, FOR SOLUTION INTRAMUSCULAR; INTRAVENOUS EVERY 6 HOURS
Status: DISCONTINUED | OUTPATIENT
Start: 2023-05-03 | End: 2023-05-07

## 2023-05-03 RX ORDER — NALOXONE HYDROCHLORIDE 0.4 MG/ML
0.4 INJECTION, SOLUTION INTRAMUSCULAR; INTRAVENOUS; SUBCUTANEOUS
Status: DISCONTINUED | OUTPATIENT
Start: 2023-05-03 | End: 2023-05-07 | Stop reason: HOSPADM

## 2023-05-03 RX ORDER — LANOLIN ALCOHOL/MO/W.PET/CERES
3 CREAM (GRAM) TOPICAL
Status: DISCONTINUED | OUTPATIENT
Start: 2023-05-03 | End: 2023-05-07 | Stop reason: HOSPADM

## 2023-05-03 RX ORDER — GADOBUTROL 604.72 MG/ML
10 INJECTION INTRAVENOUS ONCE
Status: COMPLETED | OUTPATIENT
Start: 2023-05-04 | End: 2023-05-04

## 2023-05-03 RX ORDER — LORAZEPAM 2 MG/ML
0.5 INJECTION INTRAMUSCULAR ONCE
Status: COMPLETED | OUTPATIENT
Start: 2023-05-03 | End: 2023-05-03

## 2023-05-03 RX ADMIN — SODIUM CHLORIDE: 9 INJECTION, SOLUTION INTRAVENOUS at 01:14

## 2023-05-03 RX ADMIN — SODIUM CHLORIDE: 9 INJECTION, SOLUTION INTRAVENOUS at 20:49

## 2023-05-03 RX ADMIN — GADOBUTROL 10 ML: 604.72 INJECTION INTRAVENOUS at 00:16

## 2023-05-03 RX ADMIN — SODIUM CHLORIDE 1000 ML: 9 INJECTION, SOLUTION INTRAVENOUS at 01:09

## 2023-05-03 RX ADMIN — AMPICILLIN SODIUM AND SULBACTAM SODIUM 3 G: 2; 1 INJECTION, POWDER, FOR SOLUTION INTRAMUSCULAR; INTRAVENOUS at 10:44

## 2023-05-03 RX ADMIN — VANCOMYCIN HYDROCHLORIDE 1500 MG: 5 INJECTION, POWDER, LYOPHILIZED, FOR SOLUTION INTRAVENOUS at 20:44

## 2023-05-03 RX ADMIN — PERFLUTREN 2.5 ML: 6.52 INJECTION, SUSPENSION INTRAVENOUS at 10:38

## 2023-05-03 RX ADMIN — PIPERACILLIN AND TAZOBACTAM 3.38 G: 3; .375 INJECTION, POWDER, LYOPHILIZED, FOR SOLUTION INTRAVENOUS at 03:20

## 2023-05-03 RX ADMIN — AMPICILLIN SODIUM AND SULBACTAM SODIUM 3 G: 2; 1 INJECTION, POWDER, FOR SOLUTION INTRAMUSCULAR; INTRAVENOUS at 18:26

## 2023-05-03 RX ADMIN — LORAZEPAM 0.5 MG: 2 INJECTION INTRAMUSCULAR; INTRAVENOUS at 23:27

## 2023-05-03 ASSESSMENT — ACTIVITIES OF DAILY LIVING (ADL)
ADLS_ACUITY_SCORE: 35
ADLS_ACUITY_SCORE: 37
ADLS_ACUITY_SCORE: 37
ADLS_ACUITY_SCORE: 35
ADLS_ACUITY_SCORE: 35
ADLS_ACUITY_SCORE: 37
ADLS_ACUITY_SCORE: 35
ADLS_ACUITY_SCORE: 35
ADLS_ACUITY_SCORE: 37
ADLS_ACUITY_SCORE: 35

## 2023-05-03 NOTE — PHARMACY-VANCOMYCIN DOSING SERVICE
Pharmacy Vancomycin Initial Note  Date of Service May 2, 2023  Patient's  1970  53 year old, male    Indication: Sepsis     Current estimated CrCl = Estimated Creatinine Clearance: 70.6 mL/min (A) (based on SCr of 1.5 mg/dL (H)).    Creatinine for last 3 days  2023:  7:07 PM Creatinine 1.50 mg/dL    Recent Vancomycin Level(s) for last 3 days  No results found for requested labs within last 3 days.      Vancomycin IV Administrations (past 72 hours)                   vancomycin (VANCOCIN) 2,000 mg in 0.9% NaCl 500 mL intermittent infusion (mg) 2,000 mg New Bag 23                Nephrotoxins and other renal medications (From now, onward)    Start     Dose/Rate Route Frequency Ordered Stop    23 0320  piperacillin-tazobactam (ZOSYN) 3.375 g vial to attach to  mL bag        Note to Pharmacy: Extended infusion dosing to start 6 hours after initial infusion.   See Hyperspace for full Linked Orders Report.    3.375 g  over 240 Minutes Intravenous EVERY 8 HOURS 23  vancomycin (VANCOCIN) 2,000 mg in 0.9% NaCl 500 mL intermittent infusion         2,000 mg  over 2 Hours Intravenous ONCE 23            Contrast Orders - past 72 hours (72h ago, onward)    Start     Dose/Rate Route Frequency Stop    23  iopamidol (ISOVUE-370) solution 90 mL         90 mL Intravenous ONCE 23          [employer] Prediction of Planned Initial Vancomycin Regimen  Loading dose: 2,000 mg x 1 in ED  Regimen: 1500 mg IV every 24 hours.  Start time: 10:09 on 2023  Exposure target: AUC24 (range)400-600 mg/L.hr   AUC24,ss: 428 mg/L.hr  Probability of AUC24 > 400: 58 %  Ctrough,ss: 12.5 mg/L  Probability of Ctrough,ss > 20: 14 %  Probability of nephrotoxicity (Lodise STAN ): 8 %          Plan:  1. Start vancomycin 1,500 mg IV q24h.   2. Vancomycin monitoring method: AUC  3. Vancomycin therapeutic monitoring goal: 400-600 mg*h/L  4. Pharmacy will check  vancomycin levels as appropriate in 1-3 Days.    5. Serum creatinine levels will be ordered daily for the first week of therapy and at least twice weekly for subsequent weeks.      Thank you for the consult.   Denia Campo, PharmD, BCPS

## 2023-05-03 NOTE — PLAN OF CARE
Vss other than BP which has been intermittently soft in the mid 90's. A&O. Denies pain. On Mg (2.0) and K (4.2) protocols which are a recheck in the AM. Pt NPO r/t potential procedure. PIV running NS at 125 mL/hr. Plan for pt to discharge home with family once medically cleared.     Transferring pt care. Nurse to nurse handoff completed.

## 2023-05-03 NOTE — PROGRESS NOTES
[facility]    Medicine Progress Note - Hospitalist Service    Date of Admission:  5/2/2023  53 years old male presenting with acute onset fever chills body aches right shoulder and right ankle pain and discomfort with limitation in the range of motion and some difficulty walking.  This started this weekend 3 to 4 days ago 2 days prior to that he sustained a dog bite to the finger.  This did not result in inflammation locally.  Did not take antibiotics post bite.  Otherwise he has been feeling well.  He is a .  No recent international travel.  [place] no tick bites and no exposure.  Wife is healthy.  No respiratory symptoms.    Principal Problem:    Sepsis (H)  Active Problems:    H/O splenectomy    HTN (hypertension)    MARY JO (acute kidney injury) (H)    Hypomagnesemia      Assessment & Plan    1. Sepsis-- etiology not clear-possibly right shoulder septic joint - he is splenectomized.  Lactate > due to sepsis- resolved with fluids, blood culture NTD  MARY JO and hypomagnesemia- resolved   UA done at urgent care - could suggest UTI.    CT chest abdomen pelvis-showed --- Horseshoe kidney noted. No hydronephrosis. No PNA, CT head normal.  Stable 6mm lung nodule, stable for 5 yrs  - ID consulted- febrile illness with arthralgias notably right shoulder and ankle, body aches, transient diarrhea, that followed a superficial dog bite to the finger  While there is no evidence of finger related cellulitis or tenosynovitis it is possible this active as a portal of entry for a disseminated bacterial infection  Leukocytosis is consistent with bacterial infection  History of splenectomy for spherocytosis     Plan  Continue vancomycin   Do Unasyn instead of Zosyn  Await blood cultures  Consider MRI right shoulder  Follow-up blood cultures  Obtain stool studies   - repeat CBC./CMP, blood cultures   - MRI of spine - NAD  -ortho consulted-IR aspiration of Right shoulder and  "right ankle as he is symptomatic with hardware in place  - NPO incase of procedure     2. HTN  - PTA meds      3. CT scan showed --  Incidental 6 mm right lower lobe pulmonary nodule  - advised - he was informed of this and need follow up with PCP  - he is non smoker      4. Acute renal failure - recent diarrhea, resolved. Some mid abdominal pain - CT abdomen did not show acute findings  - IVF  - check total CK     429A -- MR of T-L -- unremarkable         VTE prophylaxis: SCDs,  if staying more than 24 hours, consider adding subcutaneous lovenox or heparin.   Diet:  Regular   Code Status: Full   COVID vaccination: yes  Barriers to discharge: admitting clinical condition  Discharge Disposition and goals:  Unable to determine at this point, pending clinical progress and response to treatment.   Diet: NPO per Anesthesia Guidelines for Procedure/Surgery Except for: Meds      Clinically Significant Risk Factors Present on Admission           # Hypercalcemia: corrected calcium is >10.1, will monitor as appropriate    # Hypoalbuminemia: Lowest albumin = 2.8 g/dL at 5/3/2023  7:16 AM, will monitor as appropriate     # Hypertension: home medication list includes antihypertensive(s)      # Obesity: Estimated body mass index is 33.47 kg/m  as calculated from the following:    Height as of this encounter: 1.803 m (5' 11\").    Weight as of this encounter: 108.9 kg (240 lb).           Disposition Plan      Expected Discharge Date: 05/04/2023                  Rajeev Mitchell MD  Hospitalist Service  [facility]  Securely message with [employer] (more info)  Text page via [email or web address] Paging/Directory   ______________________________________________________________________    Interval History    feels better. Rt. Shoulder and ankle pain  - no SP/SOB  - no fever      Physical Exam   Vital Signs: Temp: 97.5  F (36.4  C) Temp src: Oral BP: 118/79 Pulse: 67   Resp: 18 SpO2: 96 % O2 Device: Nasal cannula Oxygen Delivery: 1 " LPM  Weight: 240 lbs 0 oz    Alert awake  Vision Baseline  Neck supple  Oral mucosa moist  bilateral air entry heard,  S1-S2 normal  Abdomen is soft no tenderness  Extremities - decreased DANK- rt. Shoulder and right ankle-  Neurologically- no new Gross deficits from baseline-Moving all 4 extremities  Psych-mood okay and appropriate to circumstances      Medical Decision Making       35 MINUTES SPENT BY ME on the date of service doing chart review, history, exam, documentation & further activities per the note.      Data     I have personally reviewed the following data over the past 24 hrs:    20.5 (H)  \   12.8 (L)   / 150     136 109 (H) 26 (H) /  98   4.2 22 1.23 \       ALT: 40 AST: 32 AP: 64 TBILI: 1.0   ALB: 2.8 (L) TOT PROTEIN: 6.4 LIPASE: 13       Procal: 10.74 (H) CRP: 8.3 (H) Lactic Acid: 1.9         Imaging results reviewed over the past 24 hrs:   Recent Results (from the past 24 hour(s))   CT Chest/Abdomen/Pelvis w Contrast    Narrative    EXAM: CT CHEST/ABDOMEN/PELVIS W CONTRAST  LOCATION: [facility]  DATE/TIME: 5/2/2023 7:56 PM CDT    INDICATION: fever, leukocystosis, cough, abd pain  COMPARISON: Same day chest radiograph  TECHNIQUE: CT scan of the chest, abdomen, and pelvis was performed following injection of IV contrast. Multiplanar reformats were obtained. Dose reduction techniques were used.   CONTRAST: 90 mL Isovue 370    FINDINGS:   LUNGS AND PLEURA: No focal airspace consolidation or pleural effusion. Incidental 6 mm right lower lobe pulmonary nodule (series 4 image 179).    MEDIASTINUM/AXILLAE: No suspicious lymphadenopathy.    CORONARY ARTERY CALCIFICATION: Mild.    HEPATOBILIARY: Normal.    PANCREAS: Normal.    SPLEEN: Prior splenectomy.    ADRENAL GLANDS: Normal.    KIDNEYS/BLADDER: Horseshoe kidney noted. No hydronephrosis. Urinary bladder is unremarkable.    BOWEL: Diverticulosis of the colon. No acute inflammatory change. No obstruction. Normal  appendix.    LYMPH NODES: No suspicious lymphadenopathy.    VASCULATURE: Scattered calcified atherosclerosis.    PELVIC ORGANS: Vasectomy clips noted. Otherwise unremarkable.    MUSCULOSKELETAL: No acute bony abnormality. Right shoulder arthroplasty partially visualized.      Impression    IMPRESSION:  1.  No definite acute abnormality in the chest, abdomen or pelvis.  2.  Incidental 6 mm right lower lobe pulmonary nodule. Consider follow-up described below.  3.  Horseshoe kidney noted. No hydronephrosis.    REFERENCE:  Guidelines for Management of Incidental Pulmonary Nodules Detected on CT Images: From the Fleischner Society 2017.   Guidelines apply to incidental nodules in patients who are 35 years or older.  Guidelines do not apply to lung cancer screening, patients with immunosuppression, or patients with known primary cancer.    SINGLE NODULE    Nodule size 6-8 mm  Low-risk patients: Follow-up CT at 6-12 months, then consider CT at 18-24 months.  High-risk patients: Follow-up CT at 6-12 months, then at 18-24 months if no change.    Consider referral to lung nodule clinic.     MR Thoracic Spine w/o & w Contrast    Narrative    EXAM: MR THORACIC SPINE W/O and W CONTRAST  LOCATION: [facility]  DATE/TIME: 5/3/2023 12:43 AM CDT    INDICATION: asplenia, fever, low back pain  COMPARISON: None.  CONTRAST: 10mL Gadavist  TECHNIQUE: Routine Thoracic Spine MRI without and with IV contrast.    FINDINGS:   There is good anatomic alignment to the thoracic spine. The vertebral body heights are well-maintained throughout and have appropriate signal characteristics for the patient's age. There are incidental hemangiomas seen within the T9 and T12 vertebral   bodies. There is no abnormal signal or change in size of the thoracic spinal cord. There is no evidence of an intracanal mass or hemorrhage. There is no significant disc bulge or herniation to lead to canal compromise. There is no significant  neural   foraminal narrowing. The paraspinal soft tissues are unremarkable. The lungs visualized on this study are clear.      Impression    IMPRESSION:  1. Good anatomic alignment and vertebral body heights maintained.  2. No evidence of bone marrow edema or abnormal enhancement.  3. No significant canal compromise or significant neural foraminal narrowing throughout thoracic spine.    MR Lumbar Spine w/o & w Contrast    Narrative    EXAM: MR LUMBAR SPINE W/O and W CONTRAST  LOCATION: [facility]  DATE/TIME: 5/3/2023 12:44 AM CDT    INDICATION: asplenia, fever, low back pain  COMPARISON: None.  CONTRAST: 10mL Gadavist  TECHNIQUE: Routine Lumbar Spine MRI without and with IV contrast.    FINDINGS:   Nomenclature is based on 5 lumbar type vertebral bodies. Normal vertebral body heights, alignment and marrow signal. Normal distal spinal cord and cauda equina with conus medullaris at T12. There is no abnormal signal or change in size of the conus   medullaris. There is no evidence of an intracanal mass or hemorrhage. Following the administration of contrast no abnormal enhancement is visualized.. No extraspinal abnormality. Unremarkable visualized bony pelvis.    T12-L1: Normal disc height and signal. No herniation. Normal facets. No spinal canal or neural foraminal stenosis.     L1-L2: Normal disc height and signal. No herniation. Normal facets. No spinal canal or neural foraminal stenosis.    L2-L3: Normal disc height and signal. No herniation. Normal facets. No spinal canal or neural foraminal stenosis.     L3-L4: Normal disc height and signal. No herniation. Normal facets. No spinal canal or neural foraminal stenosis.    L4-L5: Normal disc height and signal. No herniation. Normal facets. No spinal canal or neural foraminal stenosis.    L5-S1: Normal disc height and signal. No herniation. Normal facets. No spinal canal or neural foraminal stenosis.      Impression    IMPRESSION:  1.  Good  anatomic alignment and vertebral body heights maintained.  2.  No evidence of edema or abnormal enhancement.  3.  No significant canal compromise or neural foraminal narrowing throughout lumbar spine.   CT Head w/o Contrast    Narrative    EXAM: CT HEAD W/O CONTRAST  LOCATION: [facility]  DATE/TIME: 5/3/2023 9:31 AM CDT    INDICATION: Headache; Low back pain; r o Infection; None of the following: Fever chills, or low back pain with rest or at night; Immunocompromised; No known automatically detected potential contraindications to imaging  COMPARISON: None.  TECHNIQUE: Routine CT Head without IV contrast. Multiplanar reformats. Dose reduction techniques were used.    FINDINGS:  INTRACRANIAL CONTENTS: No intracranial hemorrhage, extraaxial collection, or mass effect.  No CT evidence of acute infarct. Normal parenchymal attenuation. Normal ventricles and sulci.     VISUALIZED ORBITS/SINUSES/MASTOIDS: No intraorbital abnormality. No paranasal sinus mucosal disease. No middle ear or mastoid effusion.    BONES/SOFT TISSUES: No acute abnormality.      Impression    IMPRESSION:  1.  Normal head CT.   XR Ankle Right G/E 3 Views    Narrative    EXAM: XR ANKLE RIGHT G/E 3 VIEWS  LOCATION: [facility]  DATE/TIME: 5/3/2023 9:46 AM CDT    INDICATION: pain  COMPARISON: None.      Impression    IMPRESSION: There are postoperative changes from prior fixation of a medial malleolar fracture with 2 screws. No definite acute fracture is seen. Normal alignment. Ankle mortise is congruent.   XR Shoulder Right G/E 3 Views    Narrative    EXAM: XR SHOULDER RIGHT G/E 3 VIEWS  LOCATION: [facility]  DATE/TIME: 5/3/2023 9:46 AM CDT    INDICATION: Decreased range of motion. Shoulder pain.  COMPARISON: 10/29/2018      Impression    IMPRESSION: There are postoperative changes from right shoulder arthroplasty, in standard alignment. No periprosthetic lucency or fracture is  seen. There are mild degenerative changes at the right acromioclavicular joint.

## 2023-05-03 NOTE — PLAN OF CARE
A&Ox4, calls appropriately.   1L O2 overnight.   Pt received a total of 3L NS boluses, NS infusing 125ml/hr continuously. Blood pressures remain on the softer side. Maintaining MAP > 65.   Zosyn and vanco infused. Afebrile   K/mg protocols, recheck labs this morning   MRI completed overnight     Problem: Sepsis/Septic Shock  Goal: Absence of Infection Signs and Symptoms  Outcome: Progressing   Goal Outcome Evaluation:

## 2023-05-03 NOTE — PHARMACY-ADMISSION MEDICATION HISTORY
Pharmacist Admission Medication History    Admission medication history is complete. The information provided in this note is only as accurate as the sources available at the time of the update.    Medication reconciliation/reorder completed by provider prior to medication history? No    Information Source(s): [facility] via N/A    Pertinent Information  Changes made to PTA medication list:    Added: losartan-hydrochlorothiazide, flonase, mvi, cetirizine, zinc gluconate    Deleted: acetaminophen, hydroxyzine, naproxen, oxycodone, polyethylene glycol     Changed: strength on losartan-hydrochlorothiazide, newest as of 3/30/23 is 100-12.5 mg    Allergies reviewed with patient and updates made in EHR: no    Medication History Completed By: Mirella [facility] 5/2/2023 9:46 PM    Prior to Admission medications    Medication Sig Last Dose Taking? Auth Provider Long Term End Date   cetirizine (ZYRTEC) 10 MG tablet Take 10 mg by mouth daily Unknown Yes Unknown, Entered By History     fluticasone (FLONASE) 50 MCG/ACT nasal spray Spray 2 sprays in nostril daily Unknown Yes Unknown, Entered By History     losartan-hydrochlorothiazide (HYZAAR) 100-12.5 MG tablet Take 1 tablet by mouth daily Unknown Yes Unknown, Entered By History Yes    multivitamin, therapeutic (THERA-VIT) TABS tablet Take 1 tablet by mouth daily Unknown Yes Unknown, Entered By History     zinc gluconate 50 MG tablet Take 50 mg by mouth daily Unknown Yes Unknown, Entered By History

## 2023-05-03 NOTE — PLAN OF CARE
Vss. A&O. Denies pain. Regular diet and plan for pt to be NPO at midnight r/t potential procedure per [facility] who will reassess in the AM. Remains on Mg (2.0) and K (4.2) protocols which are a recheck in the AM. MRI of shoulder scheduled for 2300. Plan to discharge home with family once medically cleared.

## 2023-05-03 NOTE — CONSULTS
ORTHOPEDIC CONSULTATION    Consultation  Jose Ramon Gold,  1970, MRN [id number]    [facility]  H/O splenectomy [Z90.81]    PCP: System, Provider Not In, None   Code status:  Full Code       Extended Emergency Contact Information  Primary Emergency Contact: Rubina Gold  Address: [address] [place] [address] [facility]  Home Phone: [phone]  Relation: Spouse         IMPRESSION:  Right ankle and shoulder pain, initial encounter  Dog bite left hand  Splenectomy history     PLAN:  This patient was discussed with Dr. Hill, on-call surgeon for [facility] and they are in agreement with the following plan.     - CRP and ESR  - IR aspiration of Right shoulder and right ankle as he is symptomatic with hardware in place  - NPO incase of procedure  - ID Consulted appreciate input  - pain control  - MARs shoulder MRI      - aspiration only with enough fluid for cultures not cell count. Will order regular diet for today and NPO at midnight incase we need to take to OR tomorrow.    Thank you for including [facility] in the care of Jose Ramon Gold. It has been a pleasure participating in Jose Ramon's care.    CHIEF COMPLAINT: Sepsis (H)    HISTORY OF PRESENT ILLNESS:  The patient is seen in orthopedic consultation at the request of Dr. Mitchell.  The patient is a 53 year old male with moderate pain of the right  shoulder and ankle. The patient reports that on Thursday he was hanging out with a friend when his friend's dog bit his left hand.  He did not go in for the dog bite or just talk with his doctor about antibiotics for the dog bite.  He then on Saturday night started develop a fever, chills, back and joint pains, fatigue and lightheadedness.  He notes that the joint pain is primarily wearing his right shoulder and ankle.  Of note he has previous history of splenectomy, right total shoulder surgery and ankle surgery to fix a medial malleolus fracture.  He denies any numbness or  tingling in either upper or lower extremity.  He notes that the dog bite is healing he did apply Neosporin to the dog bite on Thursday and Friday and noted that it scabbed over and is no longer painful.    PAST MEDICAL HISTORY:  History reviewed. No pertinent past medical history.       ALLERGIES:   Review of patient's allergies indicates No Known Allergies      MEDICATIONS UPON ADMISSION:  Medications were reviewed.  They include:   (Not in a hospital admission)        SOCIAL HISTORY:   he  reports that he has quit smoking. He has never used smokeless tobacco. He reports current alcohol use of about 2.0 - 3.0 standard drinks of alcohol per week. He reports that he does not use drugs.    FAMILY HISTORY:  family history is not on file.      REVIEW OF SYSTEMS:   Reviewed with patient. See HPI, otherwise negative       PHYSICAL EXAMINATION:  Vitals: Temp:  [98.5  F (36.9  C)-100.8  F (38.2  C)] 98.5  F (36.9  C)  Pulse:  [] 69  Resp:  [16-18] 18  BP: ()/(51-78) 94/52  SpO2:  [91 %-99 %] 96 %  General: On examination, the patient is resting comfortably, NAD, awake and alert and oriented to person, place, time, and and general circumstances   SKIN: There is eschar over the dog bite on the left ring finger.  No surrounding erythema, swelling, fluctuance.  No swelling of the hand.  The right ankle appears slightly more swollen than the left but he notes that this is normal.  No warmth or tenderness palpation of the ankle able to plantar and dorsiflex with 5/5 strength.  For the right shoulder there is no erythema or swelling noted.  Pulses:  radial, dorsalis pedis and posterior tibial pulse is intact and equal bilaterally  Sensation: intact and equal bilaterally to the distal lower and upper extremities.  Tenderness: Tenderness to palpation of the anterior shoulder along the bicipital groove.  No tenderness to palpation over the lateral or posterior shoulder, upper arm or forearm, hand or wrist  ROM: Able to  range his fingers, hand wrist and elbow without difficulty.  He is able to actively flex his shoulder forward to 80 to 90 degrees and has difficulty with rest of the range of motion.  Seen with lateral range of motion.  Able to flex shoulder posteriorly without difficulty.  Motor: 5/5 strength of his  of his right hand supination, pronation.  Contralateral side= Full range of motion, Negative joint instability findings, 5/5 motor groups about the joint, Non-tender.       RADIOGRAPHIC EVALUATION:  Personally reviewed    PERTINENT LABS:  Lab Results: personally reviewed.     Lab Results   Component Value Date    WBC 20.5 05/03/2023    HGB 12.8 05/03/2023    HCT 36.8 05/03/2023    MCV 96 05/03/2023     05/03/2023         Gisela Cherry PA-C, EMMA  Date: 5/3/2023  Time: 10:07 AM    CC1:   Rajeev Mitchell MD    CC2:   System, Provider Not In

## 2023-05-03 NOTE — UTILIZATION REVIEW
Admission Status; Secondary Review Determination   Under the authority of the Utilization Management Committee, the utilization review process indicated a secondary review on Jose Ramon Gold. The review outcome is based on review of the medical records, discussions with staff, and applying clinical experience noted on the date of the review.   (x) Inpatient Status Appropriate - This patient's medical care is consistent with medical management for inpatient care and reasonable inpatient medical practice.     RATIONALE FOR DETERMINATION   Jose Ramon Gold is a 53 yr old male s/p splenectomy who presented with fever, chills, shoulder and ankle pain with limitation in ROM and difficulties ambulating.  Recent dog bit to finger as well and possible tick exposure.  Also diarrhea recently with up to 6 BM/day.  On presentation 5/2 he was with tachycardia >110, temp 100.8.  Lactic acid 2.6 with MARY JO Cr 1.5 (baseline 1.0).  ESR 26, CRP 8.3 and Procal 10.74 all with WBC 20.3.  He was treated with IVF and broad spectrum antibiotics.  This morning while he feels a little better, he is still with WBC 20.5.  ID has seen and recommending ongoing IV abx.  Blood cultures in process.  Attempting to identify source.  He has prior right total shoulder surgery as well as surgery to fix medial malleolus fracture of ankle thus has hardware in both places.  IV abx continue.  IR aspiration of both joints planned.  May need I and D.  Tick borne diseases being investigated.  His prior splenectomy makes him high risk for sepsis and infection.  Stool studies also being performed.   BP dropped after arrival in ED despite IVF and increased some then dropped again.  Continues on IVF at 125cc/hr with Unasyn every 8 hours and Vanco every 24 hours.  ECHO being performed as some SOB on presentation in addition.      At the time of admission with the information available to the attending physician more than 2 nights Hospital complex care was  anticipated, based on patient risk of adverse outcome if treated as outpatient and complex care required. Inpatient admission is appropriate based on the Medicare guidelines.   The information on this document is developed by the utilization review team in order for the business office to ensure compliance. This only denotes the appropriateness of proper admission status and does not reflect the quality of care rendered.   The definitions of Inpatient Status and Observation Status used in making the determination above are those provided in the CMS Coverage Manual, Chapter 1 and Chapter 6, section 70.4.   Sincerely,   Carol Adams MD  Utilization Review  Physician Advisor  [facility]

## 2023-05-03 NOTE — CONSULTS
[facility]  General ID Service Consult      Patient: Jose Ramon Gold  YOB: 1970, MRN: [id number]  Date of Admission:  5/2/2023  Date of Consult: 05/03/2023  Consult Requested by: Rajeev Mitchell MD  Admission Diagnosis: H/O splenectomy [Z90.81]      ID Assessment & Plan   Acute febrile illness with arthralgias notably right shoulder and ankle, body aches, transient diarrhea, that followed a superficial dog bite to the finger  While there is no evidence of finger related cellulitis or tenosynovitis it is possible this active as a portal of entry for a disseminated bacterial infection  Leukocytosis is consistent with bacterial infection  History of splenectomy for spherocytosis    Plan  Continue vancomycin   Do Unasyn instead of Zosyn  Await blood cultures  Consider MRI right shoulder  Follow-up blood cultures  Obtain stool studies    Rc Mancuso MD  [facility]  ______________________________________________________________________    Chief Complaint       History of Present Illness   53 years old male presenting with acute onset fever chills body aches right shoulder and right ankle pain and discomfort with limitation in the range of motion and some difficulty walking.  This started this weekend 3 to 4 days ago.  2 days prior to that he sustained a dog bite to the finger.  This did not result in inflammation locally.  Did not take antibiotics post bite.  Otherwise he has been feeling well.  He is a .  No recent international travel.  He was recently at the [facility] no tick bites and no exposure.  Wife is healthy.  No respiratory symptoms no nasal congestion sore throat coughing.  No rash although the wife thinks he might have had some redness right foot.  That is not obvious today.  Also reports diarrhea up to 6 x 1 day but that has been gradually improving.  This started along with the joint aches.  He was started on vancomycin and  Zosyn yesterday after normal CT chest and abdomen.  He is already feeling better.  Patient seen in the emergency room.  With orthopedics.    Review of Systems   The 10 point Review of Systems is negative other than noted in the HPI or here.     Past Medical History    Hereditary spherocytosis status post splenectomy    Past Surgical History   Past Surgical History:   Procedure Laterality Date     ANKLE SURGERY       JOINT REPLACEMENT      shoulder     SPLENECTOMY       ZZC RECONSTR TOTAL SHOULDER IMPLANT Right 10/29/2018    Procedure: RIGHT TOTAL SHOULDER ARTHROPLASTY;  Surgeon: Live Mae MD;  Location: [facility];  Service: Orthopedics       Social History   Social History     Tobacco Use     Smoking status: Former     Smokeless tobacco: Never   Substance Use Topics     Alcohol use: Yes     Alcohol/week: 2.0 - 3.0 standard drinks of alcohol     Drug use: No       Family History     Reviewed and not contributory to the currentproblem      Medications   I have reviewed this patient's current medications    Allergies   No Known Allergies    Physical Exam   Vital Signs: Temp: 98.5  F (36.9  C) Temp src: Oral BP: 94/52 Pulse: 69   Resp: 18 SpO2: 96 % O2 Device: Nasal cannula Oxygen Delivery: 1 LPM  Weight: 240 lbs 0 oz    Gen. appearance nontoxic  Eyes no conjunctivitis or icterus  Neck no stiffness or neck vein distention, no LN  Oral no thrush or ulcers  Heart  no S3 or murmurs  Lungs clear no wheeze  Abdomen soft not tender  Extremities right shoulder has a scar surgical.  No direct tenderness but limited range of motion with pain.  Right ankle minimal edema good range of motion.no redness or warmth  Hand finger with a scab no synovitis or cellulitis  Skin  no rash or emboli  Neurologic alert oriented no focal deficits        Data   Inflammatory Markers No lab results found.     Hematology Studies   Recent Labs   Lab Test 05/03/23  0716 05/02/23  1907 10/29/18  0944   WBC 20.5* 20.3* 9.7   HGB 12.8* 15.0  14.8   MCV 96 94 96    202 408       Metabolic Studies   Recent Labs   Lab Test 05/03/23  0716 05/02/23  1907    132*   POTASSIUM 4.2 4.3   CHLORIDE 109* 98   CO2 22 19*   BUN 26* 31*   CR 1.23 1.50*   GFRESTIMATED 70 55*       Hepatic Studies    Recent Labs   Lab Test 05/03/23  0716 05/02/23  1907   BILITOTAL 1.0 1.2*   ALKPHOS 64 83   ALBUMIN 2.8* 3.5   AST 32 42*   ALT 40 47*       Most Recent 6 Bacteria Isolates From Any Culture (See EPIC Reports for Culture Details):No lab results found.    Urine Studies  No lab results found.    Vancomycin Levels  No lab results found.    Invalid input(s): VANCO    Hepatitis B Testing No lab results found.  Hepatitis C Testing   No results found for: HCVAB, HQTG, HCGENO, HCPCR, HQTRNA, HEPRNA  HIVTesting No lab results found.    Respiratory Virus Testing    No results found for: RS, FLUAG  COVID-19 Antibody Results, Testing for Immunity         No data to display            COVID-19 PCR Results        5/2/2023    21:44   COVID-19 PCR Results   SARS CoV2 PCR Negative

## 2023-05-03 NOTE — PROGRESS NOTES
Alert and oriented. VSS. Stool sample sent. One more stool sample needed for stool PCR. Sepsis triggered, provider notified.No labs/lactate needed at this time per provider.NPO at midnight. Independent in room. Nurse to Nurse hand off completed.

## 2023-05-03 NOTE — H&P
[facility] MEDICINE ADMISSION HISTORY AND PHYSICAL       Assessment & Plan      1. Sepsis concerns -- etiology not clear - he is splenectomized. UA done at urgent care - could suggest UTI.  CT showed --- Horseshoe kidney noted. No hydronephrosis    Other considerations -- wound from dog bite - left mid finger distal part. No photophobia and not encephalopathic. No neck rigidity. Also has back pain around distal thoracic and paraspinal r/o abscess/disciitis/osteo. Could also consider tick born infection - no skin rash that I see     - Continue IV zosyn and Vacomycin  - check for tick born infection  - Empiric addition of Doxycycline  - ID consult   - repeat CBC./CMP, blood cultures   - MRI of spine     2. HTN  - PTA meds     3. CT scan showed --  Incidental 6 mm right lower lobe pulmonary nodule  - advised - he was informed of this and need follow up with PCP  - he is non smoker     4. Acute renal failure - recent diarrhea, resolved. Some mid abdominal pain - CT abdomen did not show acute findings  - IVF  - check total CK    429A -- MR of T-L -- unremarkable       VTE prophylaxis: SCDs,  if staying more than 24 hours, consider adding subcutaneous lovenox or heparin.   Diet:  Regular   Code Status: Full   COVID vaccination: yes  Barriers to discharge: admitting clinical condition  Discharge Disposition and goals:  Unable to determine at this point, pending clinical progress and response to treatment. Patient may need transfer to SNF or ACR if unsafe to go home and needed treatment inappropriate at home setting OR may need home health care evaluation if care can be delivered at home settings. Consider referral to care manager/    PPE - I was wearing PPE when I met the patient including but not limited to - N95 mask, Gloves, and/or Safety glasses.      Care plan was created based on available information and patient's condition at the time of encounter. This was discussed with the  patient and/or family members using layman's terms, including counseling/education and they have agreed to proceed. I recommend to revise care plan and to review history if there is change in condition and/or new clinical information that is not available during my encounter. At the end of night shift, this case will be presented to the AM Hospitalist.       80 minutes spent by me on the date of service doing chart review, history, exam, diagnostic test results interpretation, documentation & further activities per the note.      Tenzin Mejia MD, MPH, FACP, [facility]  Internal Medicine - Hospitalist        Chief Complaint Fever      HISTORY     - I met him in ED-7. He was seen in urgent care for --- fever and extreme chills, alicia aches and joint pain. Noted also renal failure, mild elev of AST/ALT. He did a home COVID test - negative.  UA done, possibly UTI as cause.     - When I met him, he was comfortable, looked ill.   - He was bitten by a dog (fully vax per his report), 5 days ago - distal part of middle finger, left. Not bothered, looked healing OK - wound is 1 cm long  - Then the following day - fever, chills, body aches, back pain mainly in the thoracic and side of his spine.   - Then also reported shoulder joint pain, knee and ankle joint pains.  - Also reported 2 days of diarrhea 6x, this is better now. Still has some mid abdominal pain. No vomiting. No urinary symptoms  - Currently, still feeling sick, weak, achy, and still feverish. Eyes erythematous. Has headaches. No nuchal rigidity. He feels SOB. No cough.     - He is a  from  - and he flew last week, local, not international.     - In the ED, CT chest/abdomen/pelvis showed  - no acute findings. Has WBC of 20K. He also has acute renal failure with hyponatremia and lactic acidosis - He spiked fever of low 100s - 100.8.  He is tachy - concerns for sepsis    - Given IV vanco and Zosyn       - ROS --- No headache. No dizziness. No weakness. No CP or  SOB. No palpitations.  No nausea or vomiting. No urinary symptoms. No bleeding symptoms. No weight loss. Rest of 12 point ROS was reviewed and negative.       Past Medical History     HTN    Surgical History     Past Surgical History:   Procedure Laterality Date     ANKLE SURGERY       JOINT REPLACEMENT      shoulder     SPLENECTOMY       ZZC RECONSTR TOTAL SHOULDER IMPLANT Right 10/29/2018    Procedure: RIGHT TOTAL SHOULDER ARTHROPLASTY;  Surgeon: Live Mae MD;  Location: [facility];  Service: Orthopedics        Family History      History reviewed. No pertinent family history.      Social History      .  Social History     Socioeconomic History     Marital status:      Spouse name: Not on file     Number of children: Not on file     Years of education: Not on file     Highest education level: Not on file   Occupational History     Not on file   Tobacco Use     Smoking status: Former     Smokeless tobacco: Never   Vaping Use     Vaping status: Not on file   Substance and Sexual Activity     Alcohol use: Yes     Alcohol/week: 2.0 - 3.0 standard drinks of alcohol     Drug use: No     Sexual activity: Not on file   Other Topics Concern     Not on file   Social History Narrative     Not on file     Social Determinants of Health     Financial Resource Strain: Not on file   Food Insecurity: Not on file   Transportation Needs: Not on file   Physical Activity: Not on file   Stress: Not on file   Social Connections: Not on file   Intimate Partner Violence: Not on file   Housing Stability: Not on file          Allergies      No Known Allergies      Prior to Admission Medications      No current facility-administered medications on file prior to encounter.  cetirizine (ZYRTEC) 10 MG tablet, Take 10 mg by mouth daily  cholecalciferol (VITAMIN D3) 25 mcg (1000 units) capsule, Take 1,000 Units by mouth daily  fluticasone (FLONASE) 50 MCG/ACT nasal spray, Spray 2 sprays in nostril  "daily  losartan-hydrochlorothiazide (HYZAAR) 100-12.5 MG tablet, Take 1 tablet by mouth daily  acetaminophen (TYLENOL) 500 MG tablet, [ACETAMINOPHEN (TYLENOL) 500 MG TABLET] Take 2 tablets (1,000 mg total) by mouth 3 (three) times a day.            Review of Systems     A 12 point comprehensive review of systems was negative except as noted above in HPI.    PHYSICAL EXAMINATION       Vitals      Vitals: /72   Pulse 103   Temp 99.7  F (37.6  C)   Resp 16   Ht 1.803 m (5' 11\")   Wt 108.9 kg (240 lb)   SpO2 99%   BMI 33.47 kg/m    BMI= Body mass index is 33.47 kg/m .      Examination     General Appearance:  Alert, cooperative, no distress  Head:    Normocephalic, without obvious abnormality, atraumatic  EENT:  PERRL, conjunctiva/corneas clear, EOM's intact.   Neck:   Supple, symmetrical, trachea midline, no adenopathy; no NVE  Back:  Symmetric, no curvature, no CVA tenderness  Chest/Lungs: Clear to auscultation bilaterally, respirations unlabored, No tenderness or deformity. No abdominal breathing or use of accessory muscles.   Heart:    Regular rate and rhythm, S1 and S2 normal, no murmur, rub   or gallop  Abdomen: Soft, non-tender, bowel sounds active all four quadrants, not peritoneal on palpation. Not distended  Extremities:  Extremities normal, atraumatic, no swelling   Skin:  Has 1 cm superficial wound distal part of left mid finger   Neurologic:  Awake and alert, No lateralizing or localizing signs . No nuchal rigdity        Pertinent Lab     Results for orders placed or performed during the hospital encounter of 05/02/23   CT Chest/Abdomen/Pelvis w Contrast    Impression    IMPRESSION:  1.  No definite acute abnormality in the chest, abdomen or pelvis.  2.  Incidental 6 mm right lower lobe pulmonary nodule. Consider follow-up described below.  3.  Horseshoe kidney noted. No hydronephrosis.    REFERENCE:  Guidelines for Management of Incidental Pulmonary Nodules Detected on CT Images: From the " Fleischner Society 2017.   Guidelines apply to incidental nodules in patients who are 35 years or older.  Guidelines do not apply to lung cancer screening, patients with immunosuppression, or patients with known primary cancer.    SINGLE NODULE    Nodule size 6-8 mm  Low-risk patients: Follow-up CT at 6-12 months, then consider CT at 18-24 months.  High-risk patients: Follow-up CT at 6-12 months, then at 18-24 months if no change.    Consider referral to lung nodule clinic.     Basic metabolic panel   Result Value Ref Range    Sodium 132 (L) 136 - 145 mmol/L    Potassium 4.3 3.5 - 5.0 mmol/L    Chloride 98 98 - 107 mmol/L    Carbon Dioxide (CO2) 19 (L) 22 - 31 mmol/L    Anion Gap 15 5 - 18 mmol/L    Urea Nitrogen 31 (H) 8 - 22 mg/dL    Creatinine 1.50 (H) 0.70 - 1.30 mg/dL    Calcium 9.2 8.5 - 10.5 mg/dL    Glucose 105 70 - 125 mg/dL    GFR Estimate 55 (L) >60 mL/min/1.73m2   Lactic acid whole blood   Result Value Ref Range    Lactic Acid 2.6 (H) 0.7 - 2.0 mmol/L   Result Value Ref Range    Magnesium 1.7 (L) 1.8 - 2.6 mg/dL   Result Value Ref Range    Lipase 13 0 - 52 U/L   Hepatic function panel   Result Value Ref Range    Bilirubin Total 1.2 (H) 0.0 - 1.0 mg/dL    Bilirubin Direct 0.4 <=0.5 mg/dL    Protein Total 8.0 6.0 - 8.0 g/dL    Albumin 3.5 3.5 - 5.0 g/dL    Alkaline Phosphatase 83 45 - 120 U/L    AST 42 (H) 0 - 40 U/L    ALT 47 (H) 0 - 45 U/L   CBC with platelets and differential   Result Value Ref Range    WBC Count 20.3 (H) 4.0 - 11.0 10e3/uL    RBC Count 4.52 4.40 - 5.90 10e6/uL    Hemoglobin 15.0 13.3 - 17.7 g/dL    Hematocrit 42.3 40.0 - 53.0 %    MCV 94 78 - 100 fL    MCH 33.2 (H) 26.5 - 33.0 pg    MCHC 35.5 31.5 - 36.5 g/dL    RDW 13.0 10.0 - 15.0 %    Platelet Count 202 150 - 450 10e3/uL    % Neutrophils 93 %    % Lymphocytes 4 %    % Monocytes 2 %    % Eosinophils 0 %    % Basophils 0 %    % Immature Granulocytes 1 %    NRBCs per 100 WBC 0 <1 /100    Absolute Neutrophils 18.7 (H) 1.6 - 8.3 10e3/uL     Absolute Lymphocytes 0.9 0.8 - 5.3 10e3/uL    Absolute Monocytes 0.5 0.0 - 1.3 10e3/uL    Absolute Eosinophils 0.0 0.0 - 0.7 10e3/uL    Absolute Basophils 0.1 0.0 - 0.2 10e3/uL    Absolute Immature Granulocytes 0.2 <=0.4 10e3/uL    Absolute NRBCs 0.0 10e3/uL   Extra Blue Top Tube   Result Value Ref Range    Hold Specimen JIC    Extra Red Top Tube   Result Value Ref Range    Hold Specimen JIC    ECG 12-LEAD WITH MUSE (LHE)   Result Value Ref Range    Systolic Blood Pressure  mmHg    Diastolic Blood Pressure  mmHg    Ventricular Rate 106 BPM    Atrial Rate 106 BPM    ME Interval 142 ms    QRS Duration 78 ms     ms    QTc 459 ms    P Axis 20 degrees    R AXIS 9 degrees    T Axis 16 degrees    Interpretation ECG       Sinus tachycardia  Otherwise normal ECG  No previous ECGs available  Confirmed by SEE ED PROVIDER NOTE FOR, ECG INTERPRETATION ([id number]),  ROCIO HOOVER ([id number]) on 5/2/2023 7:30:20 PM             Pertinent Radiology

## 2023-05-03 NOTE — ED PROVIDER NOTES
EMERGENCY DEPARTMENT ENCOUNTER      NAME: Jose Ramon Gold  AGE: 53 year old male  YOB: 1970  MRN: [id number]  EVALUATION DATE & TIME: No admission date for patient encounter.    PCP: No primary care provider on file.    ED PROVIDER: Bal Be M.D.    Chief Complaint   Patient presents with     Shortness of Breath     Fever     FINAL IMPRESSION:  1. H/O splenectomy    2. Sepsis with acute renal failure without septic shock, due to unspecified organism, unspecified acute renal failure type (H)      ED COURSE & MEDICAL DECISION MAKING:    Pertinent Labs & Imaging studies independently interpreted by me. (See chart for details)  7:01 PM Patient seen and examined, records from clinic visit earlier today reviewed, white blood cell count reported as elevated but result not available, chest x-ray negative, urinalysis with moderate bacteria, 5-10 white cells, 3-5 red cells, nitrite and leukocyte esterase negative.  Differential diagnosis includes but not limited to strep throat, influenza, pneumonia, urinary tract infection, pyelonephritis, diverticulitis, bacteremia, medication reaction, sepsis, cellulitis.  Patient with shaking rigors and fever along with body aches for the last couple of days.  Some and abdominal pain, some cough.  Lungs are clear on exam, suprapubic and right lower quadrant tenderness on exam.  Labs and CT scan of the chest, abdomen, pelvis are ordered.  7:34 PM white blood cell count elevated, lactate elevated at 2.6.  IV fluids of already been initiated.  Remaining labs are pending.  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  9:17 PM patient rechecked, discussed diagnosis and plan.  Patient is agreeable to admission and given the patient is asplenic with signs of sepsis and no source  found, this seems reasonable.  Patient does note a recent dog bite and this is on the left middle finger, no surrounding erythema or tracking erythema to suggest tenosynovitis or cellulitis but could be a source for bacteremia.  9:42 PM care discussed with Dr. Mejia, hospitalist for admission.  Patient complaining of more back pain, reexamined and does have some midline low lumbar tenderness to percussion although also has paraspinous tenderness.  MRI of the spine ordered for evaluation for possible epidural abscess or discitis.    At the conclusion of the encounter I discussed the results of all of the tests and the disposition. The questions were answered. The patient or family acknowledged understanding and was agreeable with the care plan.     Medical Decision Making    History:    Supplemental history from: Documented in chart, if applicable    External Record(s) reviewed: Documented in chart, if applicable.    Work Up:    Chart documentation includes differential considered and any EKGs or imaging independently interpreted by provider, where specified.    In additional to work up documented, I considered the following work up: Documented in chart, if applicable.    External consultation:    Discussion of management with another provider: Documented in chart, if applicable    Complicating factors:    Care impacted by chronic illness: Other: asplenia    Care affected by social determinants of health: N/A    Disposition considerations: Admit.    EKG:    Performed at: 8:01 PM  Impression: Normal EKG  Rate: 118  Rhythm: Sinus tachycardia  Axis: Normal  MA Interval: 152  QRS Interval: 80  QTc Interval: 459  ST Changes: No acute ischemic changes  Comparison: December 2022, no changes    I have independently reviewed and interpreted the EKG(s) documented above.    PROCEDURES:     Critical Care   Performed by: Dr Bal Be  Total critical care time: 130 minutes  Critical care was necessary to treat or prevent  imminent or life-threatening deterioration of the following conditions:  Sepsis, multiple fluid boluses, multiple antibiotics, elevated lactate  Critical care was time spent personally by me on the following activities: development of treatment plan with patient or surrogate, discussions with consultants, examination of patient, evaluation of patient's response to treatment, obtaining history from patient or surrogate, ordering and performing treatments and interventions, ordering and review of laboratory studies, ordering and review of radiographic studies, re-evaluation of patient's condition and monitoring for potential decompensation.  Critical care time was exclusive of separately billable procedures and treating other patients.      MEDICATIONS GIVEN IN THE EMERGENCY:  Medications   vancomycin (VANCOCIN) 2,000 mg in 0.9% NaCl 500 mL intermittent infusion (has no administration in time range)   piperacillin-tazobactam (ZOSYN) 3.375 g vial to attach to  mL bag (3.375 g Intravenous $New Bag 5/2/23 2131)     Followed by   piperacillin-tazobactam (ZOSYN) 3.375 g vial to attach to  mL bag (has no administration in time range)   ketorolac (TORADOL) injection 15 mg (15 mg Intravenous $Given 5/2/23 1919)   0.9% sodium chloride BOLUS (1,000 mLs Intravenous $New Bag 5/2/23 1919)   0.9% sodium chloride BOLUS (1,000 mLs Intravenous $New Bag 5/2/23 2008)   iopamidol (ISOVUE-370) solution 90 mL (90 mLs Intravenous $Given 5/2/23 2029)   acetaminophen (TYLENOL) tablet 650 mg (650 mg Oral $Given 5/2/23 2141)       NEW PRESCRIPTIONS STARTED AT TODAY'S ER VISIT  New Prescriptions    No medications on file       =================================================================    HPI    Patient information was obtained from: Patient      Jose Ramon Gold is a 53 year old male with a pertinent history of splecectomy who presents to this ED via walk-in for evaluation of fever.    Per chart review, patient was seen today  at [facility] for fever. UA with WBC of 6-10, RBC 3-5, moderate bacteria. Chest x ray negative. Patient referred to ED for further evaluation.    Patient endorses 4 days of constant fever, chills, back pain, body aches, decreased appetite, headache, and dull LLQ abdominal pain. His temperature was 100.8 in triage. He also reports having diarrhea which resolved today. Patient is a , but denies any recent travel outside of the country. He still has his appendix.    Patient denies cough and all other complaints at this time.      REVIEW OF SYSTEMS   Review of Systems   Constitutional: Positive for appetite change (decrease), chills and fever.        Positive for body aches.   Respiratory: Negative for cough.    Gastrointestinal: Positive for abdominal pain (LLQ) and diarrhea (resolved).   Musculoskeletal: Positive for back pain.   Neurological: Positive for headaches.   All other systems reviewed and are negative.     All other systems reviewed and negative    PAST MEDICAL HISTORY:  History reviewed. No pertinent past medical history.    PAST SURGICAL HISTORY:  Past Surgical History:   Procedure Laterality Date     ANKLE SURGERY       JOINT REPLACEMENT      shoulder     SPLENECTOMY       ZZC RECONSTR TOTAL SHOULDER IMPLANT Right 10/29/2018    Procedure: RIGHT TOTAL SHOULDER ARTHROPLASTY;  Surgeon: Live Mae MD;  Location: [facility];  Service: Orthopedics       CURRENT MEDICATIONS:    Current Facility-Administered Medications   Medication     piperacillin-tazobactam (ZOSYN) 3.375 g vial to attach to  mL bag    Followed by     [START ON 5/3/2023] piperacillin-tazobactam (ZOSYN) 3.375 g vial to attach to  mL bag     vancomycin (VANCOCIN) 2,000 mg in 0.9% NaCl 500 mL intermittent infusion     Current Outpatient Medications   Medication     cetirizine (ZYRTEC) 10 MG tablet     cholecalciferol (VITAMIN D3) 25 mcg (1000 units) capsule     fluticasone (FLONASE) 50  "MCG/ACT nasal spray     losartan-hydrochlorothiazide (HYZAAR) 100-12.5 MG tablet     acetaminophen (TYLENOL) 500 MG tablet       ALLERGIES:  No Known Allergies    FAMILY HISTORY:  History reviewed. No pertinent family history.    SOCIAL HISTORY:   Social History     Socioeconomic History     Marital status:    Tobacco Use     Smoking status: Former     Smokeless tobacco: Never   Substance and Sexual Activity     Alcohol use: Yes     Alcohol/week: 2.0 - 3.0 standard drinks of alcohol     Drug use: No       VITALS:  /72   Pulse 103   Temp 99.7  F (37.6  C)   Resp 16   Ht 1.803 m (5' 11\")   Wt 108.9 kg (240 lb)   SpO2 99%   BMI 33.47 kg/m      PHYSICAL EXAM:  Physical Exam  Vitals and nursing note reviewed.   Constitutional:       Appearance: Normal appearance.   HENT:      Head: Normocephalic and atraumatic.      Right Ear: External ear normal.      Left Ear: External ear normal.      Nose: Nose normal.      Mouth/Throat:      Mouth: Mucous membranes are moist.   Eyes:      Extraocular Movements: Extraocular movements intact.      Conjunctiva/sclera: Conjunctivae normal.      Pupils: Pupils are equal, round, and reactive to light.   Cardiovascular:      Rate and Rhythm: Regular rhythm. Tachycardia present.   Pulmonary:      Effort: Pulmonary effort is normal.      Breath sounds: Normal breath sounds. No wheezing or rales.   Abdominal:      General: Abdomen is flat. There is no distension.      Palpations: Abdomen is soft.      Tenderness: There is no abdominal tenderness. There is no guarding.   Musculoskeletal:         General: Normal range of motion.      Cervical back: Normal range of motion and neck supple.      Right lower leg: No edema.      Left lower leg: No edema.   Lymphadenopathy:      Cervical: No cervical adenopathy.   Skin:     General: Skin is warm and dry.   Neurological:      General: No focal deficit present.      Mental Status: He is alert and oriented to person, place, and time. " Mental status is at baseline.      Comments: No gross focal neurologic deficits   Psychiatric:         Mood and Affect: Mood normal.         Behavior: Behavior normal.         Thought Content: Thought content normal.          LAB:  All pertinent labs reviewed and interpreted.  Results for orders placed or performed during the hospital encounter of 05/02/23   CT Chest/Abdomen/Pelvis w Contrast    Impression    IMPRESSION:  1.  No definite acute abnormality in the chest, abdomen or pelvis.  2.  Incidental 6 mm right lower lobe pulmonary nodule. Consider follow-up described below.  3.  Horseshoe kidney noted. No hydronephrosis.    REFERENCE:  Guidelines for Management of Incidental Pulmonary Nodules Detected on CT Images: From the Fleischner Society 2017.   Guidelines apply to incidental nodules in patients who are 35 years or older.  Guidelines do not apply to lung cancer screening, patients with immunosuppression, or patients with known primary cancer.    SINGLE NODULE    Nodule size 6-8 mm  Low-risk patients: Follow-up CT at 6-12 months, then consider CT at 18-24 months.  High-risk patients: Follow-up CT at 6-12 months, then at 18-24 months if no change.    Consider referral to lung nodule clinic.     Basic metabolic panel   Result Value Ref Range    Sodium 132 (L) 136 - 145 mmol/L    Potassium 4.3 3.5 - 5.0 mmol/L    Chloride 98 98 - 107 mmol/L    Carbon Dioxide (CO2) 19 (L) 22 - 31 mmol/L    Anion Gap 15 5 - 18 mmol/L    Urea Nitrogen 31 (H) 8 - 22 mg/dL    Creatinine 1.50 (H) 0.70 - 1.30 mg/dL    Calcium 9.2 8.5 - 10.5 mg/dL    Glucose 105 70 - 125 mg/dL    GFR Estimate 55 (L) >60 mL/min/1.73m2   Lactic acid whole blood   Result Value Ref Range    Lactic Acid 2.6 (H) 0.7 - 2.0 mmol/L   Result Value Ref Range    Magnesium 1.7 (L) 1.8 - 2.6 mg/dL   Result Value Ref Range    Lipase 13 0 - 52 U/L   Hepatic function panel   Result Value Ref Range    Bilirubin Total 1.2 (H) 0.0 - 1.0 mg/dL    Bilirubin Direct 0.4 <=0.5  mg/dL    Protein Total 8.0 6.0 - 8.0 g/dL    Albumin 3.5 3.5 - 5.0 g/dL    Alkaline Phosphatase 83 45 - 120 U/L    AST 42 (H) 0 - 40 U/L    ALT 47 (H) 0 - 45 U/L   CBC with platelets and differential   Result Value Ref Range    WBC Count 20.3 (H) 4.0 - 11.0 10e3/uL    RBC Count 4.52 4.40 - 5.90 10e6/uL    Hemoglobin 15.0 13.3 - 17.7 g/dL    Hematocrit 42.3 40.0 - 53.0 %    MCV 94 78 - 100 fL    MCH 33.2 (H) 26.5 - 33.0 pg    MCHC 35.5 31.5 - 36.5 g/dL    RDW 13.0 10.0 - 15.0 %    Platelet Count 202 150 - 450 10e3/uL    % Neutrophils 93 %    % Lymphocytes 4 %    % Monocytes 2 %    % Eosinophils 0 %    % Basophils 0 %    % Immature Granulocytes 1 %    NRBCs per 100 WBC 0 <1 /100    Absolute Neutrophils 18.7 (H) 1.6 - 8.3 10e3/uL    Absolute Lymphocytes 0.9 0.8 - 5.3 10e3/uL    Absolute Monocytes 0.5 0.0 - 1.3 10e3/uL    Absolute Eosinophils 0.0 0.0 - 0.7 10e3/uL    Absolute Basophils 0.1 0.0 - 0.2 10e3/uL    Absolute Immature Granulocytes 0.2 <=0.4 10e3/uL    Absolute NRBCs 0.0 10e3/uL   Extra Blue Top Tube   Result Value Ref Range    Hold Specimen JIC    Extra Red Top Tube   Result Value Ref Range    Hold Specimen JIC    ECG 12-LEAD WITH MUSE (LHE)   Result Value Ref Range    Systolic Blood Pressure  mmHg    Diastolic Blood Pressure  mmHg    Ventricular Rate 106 BPM    Atrial Rate 106 BPM    MN Interval 142 ms    QRS Duration 78 ms     ms    QTc 459 ms    P Axis 20 degrees    R AXIS 9 degrees    T Axis 16 degrees    Interpretation ECG       Sinus tachycardia  Otherwise normal ECG  No previous ECGs available  Confirmed by SEE ED PROVIDER NOTE FOR, ECG INTERPRETATION ([id number]),  ROCIO HOOVER ([id number]) on 5/2/2023 7:30:20 PM         RADIOLOGY:  Reviewed all pertinent imaging. Please see official radiology report.  CT Chest/Abdomen/Pelvis w Contrast   Final Result   IMPRESSION:   1.  No definite acute abnormality in the chest, abdomen or pelvis.   2.  Incidental 6 mm right lower lobe pulmonary nodule. Consider  follow-up described below.   3.  Horseshoe kidney noted. No hydronephrosis.      REFERENCE:   Guidelines for Management of Incidental Pulmonary Nodules Detected on CT Images: From the Fleischner Society 2017.    Guidelines apply to incidental nodules in patients who are 35 years or older.   Guidelines do not apply to lung cancer screening, patients with immunosuppression, or patients with known primary cancer.      SINGLE NODULE      Nodule size 6-8 mm   Low-risk patients: Follow-up CT at 6-12 months, then consider CT at 18-24 months.   High-risk patients: Follow-up CT at 6-12 months, then at 18-24 months if no change.      Consider referral to lung nodule clinic.         MR Lumbar Spine w/o & w Contrast    (Results Pending)   MR Thoracic Spine w/o & w Contrast    (Results Pending)       I, Deon Thomas, am serving as a scribe to document services personally performed by Dr. Be based on my observation and the provider's statements to me. I, Bal Be MD attest that Deon Thomas is acting in a scribe capacity, has observed my performance of the services and has documented them in accordance with my direction.    Bal Be M.D.  Emergency Medicine  [facility] EMERGENCY ROOM  [address] [place] [address] [phone]  Dept: [phone]      Bal Be MD  05/02/23 [id number]

## 2023-05-04 ENCOUNTER — HOME INFUSION (PRE-WILLOW HOME INFUSION) (OUTPATIENT)
Dept: PHARMACY | Facility: CLINIC | Age: 53
End: 2023-05-04
Payer: OTHER GOVERNMENT

## 2023-05-04 LAB
C REACTIVE PROTEIN LHE: 5.5 MG/DL (ref 0–?)
CREAT SERPL-MCNC: 0.87 MG/DL (ref 0.7–1.3)
ERYTHROCYTE [DISTWIDTH] IN BLOOD BY AUTOMATED COUNT: 13.9 % (ref 10–15)
ERYTHROCYTE [SEDIMENTATION RATE] IN BLOOD BY WESTERGREN METHOD: 24 MM/HR (ref 0–20)
GFR SERPL CREATININE-BSD FRML MDRD: >90 ML/MIN/1.73M2
HCT VFR BLD AUTO: 37.9 % (ref 40–53)
HGB BLD-MCNC: 12.6 G/DL (ref 13.3–17.7)
MAGNESIUM SERPL-MCNC: 1.9 MG/DL (ref 1.8–2.6)
MCH RBC QN AUTO: 33.4 PG (ref 26.5–33)
MCHC RBC AUTO-ENTMCNC: 33.2 G/DL (ref 31.5–36.5)
MCV RBC AUTO: 101 FL (ref 78–100)
MRSA DNA SPEC QL NAA+PROBE: NEGATIVE
PLATELET # BLD AUTO: 161 10E3/UL (ref 150–450)
POTASSIUM BLD-SCNC: 4.2 MMOL/L (ref 3.5–5)
RBC # BLD AUTO: 3.77 10E6/UL (ref 4.4–5.9)
SA TARGET DNA: NEGATIVE
WBC # BLD AUTO: 13.3 10E3/UL (ref 4–11)

## 2023-05-04 PROCEDURE — 36415 COLL VENOUS BLD VENIPUNCTURE: CPT | Performed by: INTERNAL MEDICINE

## 2023-05-04 PROCEDURE — 83735 ASSAY OF MAGNESIUM: CPT | Performed by: HOSPITALIST

## 2023-05-04 PROCEDURE — 250N000013 HC RX MED GY IP 250 OP 250 PS 637: Performed by: INTERNAL MEDICINE

## 2023-05-04 PROCEDURE — 82565 ASSAY OF CREATININE: CPT | Performed by: INTERNAL MEDICINE

## 2023-05-04 PROCEDURE — 84132 ASSAY OF SERUM POTASSIUM: CPT | Performed by: HOSPITALIST

## 2023-05-04 PROCEDURE — 250N000011 HC RX IP 250 OP 636: Performed by: INTERNAL MEDICINE

## 2023-05-04 PROCEDURE — 87040 BLOOD CULTURE FOR BACTERIA: CPT | Performed by: INTERNAL MEDICINE

## 2023-05-04 PROCEDURE — 87641 MR-STAPH DNA AMP PROBE: CPT | Performed by: INTERNAL MEDICINE

## 2023-05-04 PROCEDURE — 85027 COMPLETE CBC AUTOMATED: CPT | Performed by: INTERNAL MEDICINE

## 2023-05-04 PROCEDURE — 99232 SBSQ HOSP IP/OBS MODERATE 35: CPT | Performed by: INTERNAL MEDICINE

## 2023-05-04 PROCEDURE — 85652 RBC SED RATE AUTOMATED: CPT

## 2023-05-04 PROCEDURE — A9585 GADOBUTROL INJECTION: HCPCS | Performed by: HOSPITALIST

## 2023-05-04 PROCEDURE — 255N000002 HC RX 255 OP 636: Performed by: HOSPITALIST

## 2023-05-04 PROCEDURE — 36415 COLL VENOUS BLD VENIPUNCTURE: CPT

## 2023-05-04 PROCEDURE — 250N000009 HC RX 250: Performed by: INTERNAL MEDICINE

## 2023-05-04 PROCEDURE — 86140 C-REACTIVE PROTEIN: CPT

## 2023-05-04 PROCEDURE — 99233 SBSQ HOSP IP/OBS HIGH 50: CPT | Performed by: INTERNAL MEDICINE

## 2023-05-04 PROCEDURE — 120N000001 HC R&B MED SURG/OB

## 2023-05-04 PROCEDURE — 258N000003 HC RX IP 258 OP 636: Performed by: INTERNAL MEDICINE

## 2023-05-04 PROCEDURE — 250N000013 HC RX MED GY IP 250 OP 250 PS 637: Performed by: HOSPITALIST

## 2023-05-04 RX ORDER — CEFTRIAXONE 1 G/1
1 INJECTION, POWDER, FOR SOLUTION INTRAMUSCULAR; INTRAVENOUS EVERY 24 HOURS
Status: DISCONTINUED | OUTPATIENT
Start: 2023-05-04 | End: 2023-05-05

## 2023-05-04 RX ORDER — LOSARTAN POTASSIUM 25 MG/1
25 TABLET ORAL DAILY
Status: DISCONTINUED | OUTPATIENT
Start: 2023-05-04 | End: 2023-05-05

## 2023-05-04 RX ORDER — SODIUM CHLORIDE 9 MG/ML
INJECTION, SOLUTION INTRAVENOUS CONTINUOUS
Status: DISCONTINUED | OUTPATIENT
Start: 2023-05-05 | End: 2023-05-07 | Stop reason: HOSPADM

## 2023-05-04 RX ADMIN — AMPICILLIN SODIUM AND SULBACTAM SODIUM 3 G: 2; 1 INJECTION, POWDER, FOR SOLUTION INTRAMUSCULAR; INTRAVENOUS at 00:51

## 2023-05-04 RX ADMIN — HYDROCODONE BITARTRATE AND ACETAMINOPHEN 2 TABLET: 5; 325 TABLET ORAL at 21:39

## 2023-05-04 RX ADMIN — AMPICILLIN SODIUM AND SULBACTAM SODIUM 3 G: 2; 1 INJECTION, POWDER, FOR SOLUTION INTRAMUSCULAR; INTRAVENOUS at 21:29

## 2023-05-04 RX ADMIN — LIDOCAINE: 40 CREAM TOPICAL at 22:07

## 2023-05-04 RX ADMIN — Medication 3 MG: at 00:51

## 2023-05-04 RX ADMIN — Medication 3 MG: at 21:39

## 2023-05-04 RX ADMIN — SODIUM CHLORIDE: 9 INJECTION, SOLUTION INTRAVENOUS at 08:27

## 2023-05-04 RX ADMIN — AMPICILLIN SODIUM AND SULBACTAM SODIUM 3 G: 2; 1 INJECTION, POWDER, FOR SOLUTION INTRAMUSCULAR; INTRAVENOUS at 15:44

## 2023-05-04 RX ADMIN — LOSARTAN POTASSIUM 25 MG: 25 TABLET, FILM COATED ORAL at 22:26

## 2023-05-04 RX ADMIN — GADOBUTROL 10 ML: 604.72 INJECTION INTRAVENOUS at 00:18

## 2023-05-04 RX ADMIN — HYDROCODONE BITARTRATE AND ACETAMINOPHEN 1 TABLET: 5; 325 TABLET ORAL at 01:07

## 2023-05-04 RX ADMIN — HYDROCODONE BITARTRATE AND ACETAMINOPHEN 1 TABLET: 5; 325 TABLET ORAL at 14:13

## 2023-05-04 RX ADMIN — CEFTRIAXONE 1 G: 1 INJECTION, POWDER, FOR SOLUTION INTRAMUSCULAR; INTRAVENOUS at 14:16

## 2023-05-04 RX ADMIN — AMPICILLIN SODIUM AND SULBACTAM SODIUM 3 G: 2; 1 INJECTION, POWDER, FOR SOLUTION INTRAMUSCULAR; INTRAVENOUS at 06:38

## 2023-05-04 ASSESSMENT — ACTIVITIES OF DAILY LIVING (ADL)
ADLS_ACUITY_SCORE: 37
DIFFICULTY_EATING/SWALLOWING: NO
ADLS_ACUITY_SCORE: 37
ADLS_ACUITY_SCORE: 22
HEARING_DIFFICULTY_OR_DEAF: NO
TOILETING_ISSUES: NO
ADLS_ACUITY_SCORE: 22
CHANGE_IN_FUNCTIONAL_STATUS_SINCE_ONSET_OF_CURRENT_ILLNESS/INJURY: NO
DEPENDENT_IADLS:: INDEPENDENT
ADLS_ACUITY_SCORE: 37
ADLS_ACUITY_SCORE: 37
DIFFICULTY_COMMUNICATING: NO
ADLS_ACUITY_SCORE: 22
WEAR_GLASSES_OR_BLIND: YES
ADLS_ACUITY_SCORE: 37
DRESSING/BATHING_DIFFICULTY: NO
DOING_ERRANDS_INDEPENDENTLY_DIFFICULTY: NO
ADLS_ACUITY_SCORE: 22
WALKING_OR_CLIMBING_STAIRS_DIFFICULTY: NO
VISION_MANAGEMENT: READING GLASSES
ADLS_ACUITY_SCORE: 22
ADLS_ACUITY_SCORE: 37
CONCENTRATING,_REMEMBERING_OR_MAKING_DECISIONS_DIFFICULTY: NO
FALL_HISTORY_WITHIN_LAST_SIX_MONTHS: NO
ADLS_ACUITY_SCORE: 37

## 2023-05-04 NOTE — PROGRESS NOTES
[facility]  Infectious Disease  Progress Note     Date of Admission:  5/2/2023    Assessment & Plan      Acute febrile illness with arthralgias notably right shoulder and ankle, body aches, transient diarrhea, that followed a superficial dog bite to the finger  While there is no evidence of finger related cellulitis or tenosynovitis it is possible this active as a portal of entry for a disseminated bacterial infection  Leukocytosis is consistent with bacterial infection  History of splenectomy for spherocytosis  History shoulder replacement; MRI OK; passive motion OK    Diarrhea better- cdiff negative    Leukocytosis , improved  Fever down    S/p shoulder tap; not much ; MRI unremarkable  Gram negative bacteremia- Pasteurella? Capnocytophaga? Salmonella?  New rash, various morphologies; pustular neck; urticaria like subtle trunk; etc     Plan  DC vancomycin   Keep Unasyn  Added ceftriaoxne  Await blood cultures, synovial cx    pending stool studies    Rc Mancuso M.D.    _______________________________________________________    Interval History   Feels better  MRI done/tap shoulder done  New rash   Can stand better after a while (initial stiffness)  Had shoulder tapped    Physical Exam   Vital Signs: Temp: 97.6  F (36.4  C) Temp src: Oral BP: 116/78 Pulse: 57   Resp: 20 SpO2: 96 % O2 Device: None (Room air) Oxygen Delivery: 1.5 LPM  Weight: 240 lbs 0 oz  Gen. appearance nontoxic  Eyes no conjunctivitis or icterus  Neck no stiffness or neck vein distention, no LN  Heart  no S3 or murmurs  Lungs clear no wheeze  Abdomen soft not tender  Extremities passive motion of shoulder OK; active motion difficult  Can stand (after stiffness resolved  Skin  no rash or emboli  Neurologic alert oriented no focal deficits      Data   Results for orders placed or performed during the hospital encounter of 05/02/23 (from the past 24 hour(s))   C. difficile Toxin B PCR with reflex to C. difficile  Antigen and Toxins A/B EIA    Specimen: Per Rectum; Stool   Result Value Ref Range    C Difficile Toxin B by PCR Negative Negative    Narrative    The [employer] Xpert C. difficile Assay, performed on the [employer]  Instrument Systems, is a qualitative in vitro diagnostic test for rapid detection of toxin B gene sequences from unformed (liquid or soft) stool specimens collected from patients suspected of having Clostridioides difficile infection (CDI). The test utilizes automated real-time polymerase chain reaction (PCR) to detect toxin gene sequences associated with toxin producing C. difficile. The Xpert C. difficile Assay is intended as an aid in the diagnosis of CDI.   MR Shoulder Right w/o & w Contrast    Narrative    EXAM: MR SHOULDER RIGHT W/O and W CONTRAST  LOCATION: [facility]  DATE/TIME: 5/4/2023 12:45 AM CDT    INDICATION: r o septic arthritis vs abscess. Previous TSA please reduce artifact; Shoulder pain; Infection;   COMPARISON: Radiographs 05/03/2023  TECHNIQUE: Routine. Metal artifact reduction sequences employed. Additional postgadolinium T1 sequences were obtained.  IV CONTRAST: 10mL Gadavist    FINDINGS:    Arthroplasty hardware produces artifact limiting assessment as part artifact reduction sequences.    ROTATOR CUFF:  Intact without evidence for tear.    CORACOACROMIAL ARCH:  -Morphology: Type II acromion. No subacromial spur. Subacromial and subcoracoid space are normal.   -Bursa: No subacromial or subcoracoid bursitis.    ACROMIOCLAVICULAR JOINT:   -Mild degenerative changes.    LONG HEAD OF BICEPS TENDON:   -No tendinopathy or tear. No tenosynovitis or subluxation.    GLENOHUMERAL JOINT:   Right total shoulder arthroplasty, producing artifact limiting evaluation. No significant joint effusion.     BONES:   Given limitations of artifact, marrow signal is unremarkable without evidence for osteomyelitis. No evidence for fracture.    SOFT TISSUES:   No significant  soft tissue inflammatory changes. No fluid collection. Normal deltoid muscle bulk. Normal visualized chest wall and axilla.      Impression    IMPRESSION:  1.  Right total shoulder arthroplasty. No joint effusion. No inflammatory change or abscess. No other gross abnormality within the limitations of artifact.   Potassium   Result Value Ref Range    Potassium 4.2 3.5 - 5.0 mmol/L   Magnesium   Result Value Ref Range    Magnesium 1.9 1.8 - 2.6 mg/dL   CBC with platelets   Result Value Ref Range    WBC Count 13.3 (H) 4.0 - 11.0 10e3/uL    RBC Count 3.77 (L) 4.40 - 5.90 10e6/uL    Hemoglobin 12.6 (L) 13.3 - 17.7 g/dL    Hematocrit 37.9 (L) 40.0 - 53.0 %     (H) 78 - 100 fL    MCH 33.4 (H) 26.5 - 33.0 pg    MCHC 33.2 31.5 - 36.5 g/dL    RDW 13.9 10.0 - 15.0 %    Platelet Count 161 150 - 450 10e3/uL   Creatinine   Result Value Ref Range    Creatinine 0.87 0.70 - 1.30 mg/dL    GFR Estimate >90 >60 mL/min/1.73m2

## 2023-05-04 NOTE — CONSULTS
Care Management Initial Consult    General Information  Assessment completed with: Patient, Spouse or significant other, wife Rubina at bedside  Type of CM/SW Visit: Initial Assessment    Primary Care Provider verified and updated as needed: Yes   Readmission within the last 30 days: No          Advance Care Planning: Advance Care Planning Reviewed:  (has Living Will at home, verbally states would want wife making medical decisions for him IF he were not able to himself)        Communication Assessment  Patient's communication style: spoken language (English or Bilingual)        Cognitive  Cognitive/Neuro/Behavioral: WDL                      Living Environment:   People in home: spouse, child(ree), adult (24 year old son staying with them)  wife Rubina  Current living Arrangements: house      Able to return to prior arrangements: yes     Family/Social Support:  Care provided by: self  Provides care for: no one  Marital Status:   Children (2 son's)          Description of Support System: Supportive, Involved       Current Resources:   Patient receiving home care services: No  Community Resources: None  Equipment/supplies currently used at home: None    Employment/Financial:  Employment Status: employed full-time     Employment/ Comments: retired - [employer]  Financial Concerns: No concerns identified   Referral to Financial Worker: No     Lifestyle & Psychosocial Needs:  Social Determinants of Health     Tobacco Use: Medium Risk (5/2/2023)    Patient History      Smoking Tobacco Use: Former      Smokeless Tobacco Use: Never      Passive Exposure: Not on file   Alcohol Use: Not on file   Financial Resource Strain: Not on file   Food Insecurity: Not on file   Transportation Needs: Not on file   Physical Activity: Not on file   Stress: Not on file   Social Connections: Not on file   Intimate Partner Violence: Not on file   Depression: Not on file   Housing Stability: Not on file     Functional  "Status:  Prior to admission patient needed assistance:   Dependent ADLs:: Independent  Dependent IADLs:: Independent  Assesssment of Functional Status: At functional baseline    Mental Health Status:  Mental Health Status: No Current Concerns       Chemical Dependency Status:  Chemical Dependency Status: No Current Concerns           Values/Beliefs:  Spiritual, Cultural Beliefs, Hoahaoism Practices, Values that affect care: no          Values/Beliefs Comment: \"Mandaen\"    Additional Information:  Chart reviewed. IV antibiotics. Labs. NPO. Ortho and ID following.       CM met with patient and wife Rubina (at bedside); assessment completed. Lives with wife in private home; their adult son is also currently staying with them. Independent with all cares, including working full time as a . Retired [employer]. PCP confirmed. No HC services.     RN CM continue to follow for final ID plan.  No needs anticipated if discharged home on PO antibiotics.   Preference for home infusion if IV abx are indicated and covered by insurance plan. Has //. Patient also has a supplemental plan with [employer]; CM sent fax to [facility] with that information. CM updated [facility] Nurse Liaison who will continue to follow.     Wife will transport home.     Carie Knowles RN        "

## 2023-05-04 NOTE — PHARMACY-VANCOMYCIN DOSING SERVICE
Pharmacy Vancomycin Note  Date of Service May 3, 2023  Patient's  1970   53 year old, male    Indication: Sepsis  Day of Therapy: 2  Current vancomycin regimen:  2000 mg load followed by 1500 mg IV q24h  Current vancomycin monitoring method: AUC  Current vancomycin therapeutic monitoring goal: 400-600 mg*h/L    InsightRX Prediction of Current Vancomycin Regimen  Loading dose: N/A  Regimen: 1500 mg IV every 24 hours.  Start time: 19:32 on 2023  Exposure target: AUC24 (range)400-600 mg/L.hr   AUC24,ss: 365 mg/L.hr  Probability of AUC24 > 400: 40 %  Ctrough,ss: 9.9 mg/L  Probability of Ctrough,ss > 20: 7 %  Probability of nephrotoxicity (Lodise STAN ): 6 %      Current estimated CrCl = Estimated Creatinine Clearance: 86.1 mL/min (based on SCr of 1.23 mg/dL).    Creatinine for last 3 days  2023:  7:07 PM Creatinine 1.50 mg/dL  5/3/2023:  7:16 AM Creatinine 1.23 mg/dL    Recent Vancomycin Levels (past 3 days)  No results found for requested labs within last 3 days.    Vancomycin IV Administrations (past 72 hours)                   vancomycin (VANCOCIN) 2,000 mg in 0.9% NaCl 500 mL intermittent infusion (mg) 2,000 mg New Bag 23                Nephrotoxins and other renal medications (From now, onward)    Start     Dose/Rate Route Frequency Ordered Stop    23 2000  vancomycin (VANCOCIN) 1,500 mg in 0.9% NaCl 250 mL intermittent infusion         1,500 mg  over 90 Minutes Intravenous EVERY 18 HOURS 23 1934      23 1100  ampicillin-sulbactam (UNASYN) 3 g vial to attach to  mL bag         3 g  over 15-30 Minutes Intravenous EVERY 6 HOURS 23 1010               Contrast Orders - past 72 hours (72h ago, onward)    Start     Dose/Rate Route Frequency Stop    23 1100  perflutren lipid microsphere (DEFINITY) injection SUSP 2.5 mL         2.5 mL Intravenous ONCE 23 1038    23 0000  gadobutrol (GADAVIST) injection 10 mL         10 mL Intravenous ONCE  05/03/23 0016    05/02/23 2000  iopamidol (ISOVUE-370) solution 90 mL         90 mL Intravenous ONCE 05/02/23 2029          Interpretation of levels and current regimen:    Has serum creatinine changed greater than 50% in last 72 hours: No    Urine output:  good urine output    Renal Function: Improving    InsightRX Prediction of Planned New Vancomycin Regimen  Loading dose: N/A  Regimen: 1500 mg IV every 18 hours.  Start time: 19:32 on 05/03/2023  Exposure target: AUC24 (range)400-600 mg/L.hr   AUC24,ss: 477 mg/L.hr  Probability of AUC24 > 400: 69 %  Ctrough,ss: 14.4 mg/L  Probability of Ctrough,ss > 20: 23 %  Probability of nephrotoxicity (Lodise STAN 2009): 10 %      Plan:  1. Increase Dose to 1500 mg every 18 hours (up from q24 hours) with improvement in creatinine.   2. Vancomycin monitoring method: AUC  3. Vancomycin therapeutic monitoring goal: 400-600 mg*h/L  4. Pharmacy will check vancomycin levels as appropriate in 1-3 Days.  5. Serum creatinine levels will be ordered daily for the first week of therapy and at least twice weekly for subsequent weeks.    Mirella Sterling, [facility]

## 2023-05-04 NOTE — PROGRESS NOTES
[facility] [place]  Hospitalist Progress Note    Admit Date:  5/2/2023  Date of Service (when I saw the patient): 05/04/2023   Provider:  Reva Kinney, DO    Assessment & Plan   Jose Ramon Gold is a 53 year old male who was admitted on 5/2/2023. He  presented with acute onset fever chills body aches right shoulder and right ankle pain and discomfort with limitation in the range of motion and some difficulty walking.  Symptoms started  3 to 4 days ago;   2 days prior to that he sustained a dog bite to the finger.  This did not result in inflammation locally.  Did not take antibiotics post bite.  He is a  but no recent international travel.  Noted also to have diarrhea and arthralgias.    He was started on vancomycin and Zosyn yesterday after normal CT chest and abdomen.   ID has been following - changed to IV unasyn and vanco    Orthopedic surgery also consulted for his right shoulder pain.  Radiology unable to get sufficient fluid for testing. Currently NPO until seen by Ortho.    Problem List:    1.    Early sepsis (hypotension, leukocytosis, bacteremia)          Gram neg bacilli bacteremia with recent dog bite          H/o splenectomy    Blood culture 2/2 5/2 positive for GNB  Blood culture 1/1 5/3, so far negative    Leukocytosis improving on IV abx  Elevated procalcitonin noted    Ehrlichia, babisea negat  c diff neg  Enteric stool panel pending  UA unremarkable    ECHO - no clear vegetation  MRI - right shoulder - no clear abscess, fluid  MRI lumbar and thoracic spine - no clear osteo  CT chest/abd/pelvis - RLL pulmonary nodule (6mm) - will need f/up  CT head - negative    Plan to repeat BC x 2 today  ID and ortho to see in f/up this am  ? NPO now for possible shoulder wash-out    Noted to have h/o splenectomy    2. HTN  PTA medications include losartan/HCTZ  Was hypotensive on arrival - SBP still a little soft this am at 116/78  Will resume smaller dose of losartan with holding  "parameters as BP increases     3.  Acute renal failure  Continues on IVF  Creat improving on IVF given  Awaiting am creat  CK not elevated    4. Decreased EF (slight) on ECHO   He denies any cardiac h/o   is a  and has had EKG and ECHO in the past (will try to obtain last echo for comparison)  Mild biatrial enlargement and RV enlargement  Global hypokinesis noted  No sure if he has a h/o CATARINA/sleep study    Will need cardiology f/up - d/w wife and pt    5. RLL pulmonary nodule  CT scan showed --  Incidental 6 mm right lower lobe pulmonary nodule  - advised - he was informed of this and need follow up with PCP and repeat CT scan imaging 6-12 months  - he is non smoker      VTE prophylaxis: SCDs, may need to transition to subcutaneous lovenox if needing prolonged stay and no surgical intervention    Diet: NPO per Anesthesia Guidelines for Procedure/Surgery Except for: Meds       Medical Decision Making     60 MINUTES SPENT BY ME on the date of service doing chart review, history, exam, documentation & further activities per the note.        Labs/Imaging Reviewed:  See Information above and Data section below    Diet: NPO per Anesthesia Guidelines for Procedure/Surgery Except for: Meds    DVT Prophylaxis: Enoxaparin (Lovenox) SQ  Caal Catheter: Not present  Code Status: Full Code      Disposition Plan     Expected Discharge Date: 05/04/2023             Entered: Reva Kinney DO 05/04/2023, 7:50 AM       The patient's care was discussed with the Bedside Nurse, Patient and Patient's Family.    Interval History   Very frustrated with care, being in the ED room and lack of knowledge about what it happening.  Hungry and awaiting ortho surgery rounds.  Loose stools improved.  HA still persists (mild).  No F/C, N/V, CP or palpitations.  No lightheadededness or dizziness when up out of bed.  Still unable to lift up his right shoulder.  No knowledge of new rash.  \"No one knows what is going on\"    -Data " reviewed today: I reviewed all new labs and imaging results over the last 24 hours. I personally reviewed the chest CT image(s) showing lung nodule, the abdominal CT image(s) showing no acute findings, the head CT image(s) showing no acute findings and the MRI  image(s) showing no concerning findings of  vertebral osteomyelitis or abscess.    Physical Exam   Temp: 98.2  F (36.8  C) Temp src: Oral BP: (!) 147/90 Pulse: 57   Resp: 20 SpO2: 98 % O2 Device: Nasal cannula Oxygen Delivery: 1.5 LPM  Vitals:    05/02/23 1856   Weight: 108.9 kg (240 lb)     Vital Signs with Ranges  Temp:  [97.5  F (36.4  C)-98.2  F (36.8  C)] 98.2  F (36.8  C)  Pulse:  [57-67] 57  Resp:  [18-20] 20  BP: (118-147)/(79-90) 147/90  SpO2:  [96 %-99 %] 98 %  I/O last 3 completed shifts:  In: 1090 [P.O.:840; IV Piggyback:250]  Out: 1300 [Urine:1300]    GEN:  Alert, oriented x 3, comfortable, no overt respiratory distress.  frustrated  HEENT:  Normocephalic/atraumatic, no scleral icterus, no nasal discharge, mouth and membranes appear moist  CV:  Regular rate and rhythm, somewhat distant but no loud murmur to ausc.  S1 + S2 noted, no S3 or S4.  LUNGS:  Clear to auscultation ant/lat bilaterally.  No rales/rhonchi/wheezing auscultated bilaterally.  No costal retractions bilaterally.  Symmetric chest rise on inhalation noted.  ABD:  Active bowel sounds, soft, non-tender/not really distended.  No rebound/guarding/rigidity.  No masses palpated.  No obvious HSM to exam.  EXT:  No significant pretibial edema or cyanosis to visualized areas bilaterally. No clear joint synovitis noted.  No calf-tenderness or asymmetry noted.  SKIN:  Dry to touch, no rashes or jaundice noted to visualized areas   PSYCH:  Mood frustrated.  Maintains direct eye contact.  NEURO:  No tremors at rest, speech is clear and appropriate.    Data   Labs:  Recent Labs   Lab 05/04/23  0619 05/03/23  0716 05/02/23  1907   NA  --  136 132*   POTASSIUM 4.2 4.2 4.3   CHLORIDE  --  109* 98    CO2  --  22 19*   ANIONGAP  --  5 15   GLC  --  98 105   BUN  --  26* 31*   CR  --  1.23 1.50*   GFRESTIMATED  --  70 55*   MEHRAN  --  8.3* 9.2     No results for input(s): NTBNPI, NTBNP in the last 168 hours.  Recent Labs   Lab 05/04/23  0619 05/03/23 0716 05/02/23  1907   WBC 13.3* 20.5* 20.3*   HGB 12.6* 12.8* 15.0   HCT 37.9* 36.8* 42.3   * 96 94    150 202     Recent Labs   Lab 05/04/23 0619 05/03/23  0716 05/02/23  1907   NA  --  136 132*   POTASSIUM 4.2 4.2 4.3   CHLORIDE  --  109* 98   CO2  --  22 19*   ANIONGAP  --  5 15   GLC  --  98 105   BUN  --  26* 31*   CR  --  1.23 1.50*   GFRESTIMATED  --  70 55*   MEHRAN  --  8.3* 9.2   MAG 1.9 2.0 1.7*   PROTTOTAL  --  6.4 8.0   ALBUMIN  --  2.8* 3.5   BILITOTAL  --  1.0 1.2*   ALKPHOS  --  64 83   AST  --  32 42*   ALT  --  40 47*     No results for input(s): INR in the last 168 hours.  No results for input(s): TSH in the last 168 hours.  Recent Labs   Lab 05/03/23  1143   COLOR Light Yellow   APPEARANCE Clear   URINEGLC Negative   URINEBILI Negative   URINEKETONE Negative   SG 1.016   UBLD 0.06 mg/dL*   URINEPH 6.5   PROTEIN 10*   NITRITE Negative   LEUKEST Negative   RBCU 6*   WBCU 1      Recent Imaging:   Recent Results (from the past 24 hour(s))   CT Head w/o Contrast    Narrative    EXAM: CT HEAD W/O CONTRAST  LOCATION: [facility]  DATE/TIME: 5/3/2023 9:31 AM CDT    INDICATION: Headache; Low back pain; r o Infection; None of the following: Fever chills, or low back pain with rest or at night; Immunocompromised; No known automatically detected potential contraindications to imaging  COMPARISON: None.  TECHNIQUE: Routine CT Head without IV contrast. Multiplanar reformats. Dose reduction techniques were used.    FINDINGS:  INTRACRANIAL CONTENTS: No intracranial hemorrhage, extraaxial collection, or mass effect.  No CT evidence of acute infarct. Normal parenchymal attenuation. Normal ventricles and sulci.     VISUALIZED  ORBITS/SINUSES/MASTOIDS: No intraorbital abnormality. No paranasal sinus mucosal disease. No middle ear or mastoid effusion.    BONES/SOFT TISSUES: No acute abnormality.      Impression    IMPRESSION:  1.  Normal head CT.   XR Ankle Right G/E 3 Views    Narrative    EXAM: XR ANKLE RIGHT G/E 3 VIEWS  LOCATION: [facility]  DATE/TIME: 5/3/2023 9:46 AM CDT    INDICATION: pain  COMPARISON: None.      Impression    IMPRESSION: There are postoperative changes from prior fixation of a medial malleolar fracture with 2 screws. No definite acute fracture is seen. Normal alignment. Ankle mortise is congruent.   XR Shoulder Right G/E 3 Views    Narrative    EXAM: XR SHOULDER RIGHT G/E 3 VIEWS  LOCATION: [facility]  DATE/TIME: 5/3/2023 9:46 AM CDT    INDICATION: Decreased range of motion. Shoulder pain.  COMPARISON: 10/29/2018      Impression    IMPRESSION: There are postoperative changes from right shoulder arthroplasty, in standard alignment. No periprosthetic lucency or fracture is seen. There are mild degenerative changes at the right acromioclavicular joint.   Echocardiogram Complete   Result Value    LVEF  45-50%    Narrative    [id number]  [id number]  [id number]  [id number]^ESTUARDO^[place] [address]     Name: SHONNA CAIN  MRN: [id number]  : 1970  Study Date: 2023 09:59 AM  Age: 53 yrs  Gender: Male  Patient Location: [facility]  Reason For Study: SOB  Ordering Physician: YESSICA MARTE  Performed By: TYLER     BSA: 2.3 m2  Height: 71 in  Weight: 240 lb  HR: 69  BP: 94/52 mmHg  ______________________________________________________________________________  Procedure  Complete Portable Echo Adult. Definity (NDC #38926-632) given intravenously.  Lot [id number], Exp . Technically difficult study. There is no comparison  study  available.  ______________________________________________________________________________  Interpretation Summary     1. Left ventricular size and wall thickness are normal. Systolic function is  mildly reduced with global hypokinesis. The estimated left ventricular  ejection fraction is 45-50%.  2. Right ventricular size is mildly enlarged. Systolic function is normal.  3. Mild biatrial enlargement.  4. No hemodynamically significant valvular abnormalities.  5. No prior study available for comparison.  ______________________________________________________________________________  I      WMSI = 2.00     % Normal = 0     X - Cannot   0 -                      (2) - Mildly 2 -          Segments  Size  Interpret    Hyperkinetic 1 - Normal  Hypokinetic  Hypokinetic  1-2     small                                                     7 -          3-5      moderate  3 - Akinetic 4 -          5 -         6 - Akinetic Dyskinetic   6-14    large               Dyskinetic   Aneurysmal  w/scar       w/scar       15-16   diffuse     Left Ventricle  The left ventricle is normal in size. There is normal left ventricular wall  thickness. The visual ejection fraction is 45-50%. Left ventricular diastolic  function is indeterminate. There is mild global hypokinesia of the left  ventricle.     Right Ventricle  The right ventricle is normal size.     Atria  The left atrium is mildly dilated. The right atrium is mildly dilated.     Mitral Valve  Mitral valve leaflets appear normal. There is trace mitral regurgitation.  There is no mitral valve stenosis.     Tricuspid Valve  Tricuspid valve leaflets appear normal. There is mild (1+) tricuspid  regurgitation. Right ventricle systolic pressure estimate normal. The right  ventricular systolic pressure is approximated at 23.0 mmHg plus the right  atrial pressure. There is no tricuspid stenosis.     Aortic Valve  Aortic valve leaflets appear normal. The aortic valve is trileaflet. No  aortic  regurgitation is present. No aortic stenosis is present.     Pulmonic Valve  The pulmonic valve is not well visualized. There is no pulmonic valvular  regurgitation. There is no pulmonic valvular stenosis.     Vessels  The aorta root is normal. The thoracic aorta is normal. IVC diameter <2.1 cm  collapsing >50% with sniff suggests a normal RA pressure of 3 mmHg.     Pericardium  There is no pericardial effusion.     Rhythm  Sinus rhythm was noted.     ______________________________________________________________________________  MMode/2D Measurements & Calculations  IVSd: 1.0 cm  LVIDd: 5.6 cm  LVIDs: 4.2 cm  LVPWd: 0.90 cm  FS: 25.2 %     LV mass(C)d: 202.7 grams  LV mass(C)dI: 88.9 grams/m2  Ao root diam: 3.4 cm  LA dimension: 4.5 cm  asc Aorta Diam: 3.6 cm  LA/Ao: 1.3  LVOT diam: 2.2 cm  LVOT area: 3.8 cm2  LA Volume Index (BP): 36.0 ml/m2     LA Volume Indexed (AL/bp): 3.6 ml/m2  RV Base: 3.9 cm  RWT: 0.32  TAPSE: 2.9 cm     Time Measurements  MM HR: 69.0 BPM     Doppler Measurements & Calculations  MV E max jewel: 75.7 cm/sec  MV A max jewel: 68.4 cm/sec  MV E/A: 1.1  MV dec slope: 390.7 cm/sec2  MV dec time: 0.19 sec  Ao V2 max: 109.7 cm/sec  Ao max P.0 mmHg  Ao V2 mean: 78.6 cm/sec  Ao mean P.8 mmHg  Ao V2 VTI: 21.9 cm  OSMIN(I,D): 3.5 cm2  OSMIN(V,D): 3.3 cm2  LV V1 max PG: 3.7 mmHg  LV V1 max: 96.5 cm/sec  LV V1 VTI: 20.6 cm  SV(LVOT): 77.3 ml  SI(LVOT): 33.9 ml/m2     PA acc time: 0.11 sec  TR max jewel: 240.0 cm/sec  TR max P.0 mmHg  AV Jewel Ratio (DI): 0.88  OSMIN Index (cm2/m2): 1.6  E/E': 6.0  E/E' av.7  Lateral E/e': 5.9  Medial E/e': 11.4  Peak E' Jewel: 12.7 cm/sec  RV S Jewel: 12.3 cm/sec     ______________________________________________________________________________  Report approved by: Sherie Romero 2023 02:01 PM         US Lower Extremity Non Vascular Right    Narrative    EXAM: US LOWER EXTREMITY NON VASCULAR RIGHT  LOCATION: [facility]  DATE/TIME: 5/3/2023 12:38 PM CDT    INDICATION: Ankle pain.  COMPARISON: None.  TECHNIQUE: Routine.    FINDINGS: No joint effusion present. No joint aspiration performed.       US Joint Injection Aspiration Major Right    Narrative    EXAMS:  1. RIGHT GLENOHUMERAL JOINT ASPIRATION  2. ULTRASOUND GUIDANCE    LOCATION: [facility]  DATE/TIME: 5/3/2023 12:40 PM CDT    INDICATION: Right shoulder pain.    PROCEDURE: Procedure and risks explained and consent received. The skin was prepped and draped in sterile fashion. 5 mL 1% lidocaine infused in local soft tissues.    Under direct ultrasound guidance, a 22 gauge spinal needle was introduced into the joint space. No joint effusion present, so 10 mL of sterile saline injected into the joint; roughly 1 mL of slightly yellow fluid was able to be aspirated.    COMPLICATIONS: None.    SPECIMEN: 1 mL slightly yellow fluid aspirated.      Impression    IMPRESSION:    1. Ultrasound-guided right glenohumeral joint aspiration.    2. No joint effusion present. Sterile solution injected into the glenohumeral joint, though only a small amount of fluid was able to be aspirated.     MR Shoulder Right w/o & w Contrast    Narrative    EXAM: MR SHOULDER RIGHT W/O and W CONTRAST  LOCATION: [facility]  DATE/TIME: 5/4/2023 12:45 AM CDT    INDICATION: r o septic arthritis vs abscess. Previous TSA please reduce artifact; Shoulder pain; Infection;   COMPARISON: Radiographs 05/03/2023  TECHNIQUE: Routine. Metal artifact reduction sequences employed. Additional postgadolinium T1 sequences were obtained.  IV CONTRAST: 10mL Gadavist    FINDINGS:    Arthroplasty hardware produces artifact limiting assessment as part artifact reduction sequences.    ROTATOR CUFF:  Intact without evidence for tear.    CORACOACROMIAL ARCH:  -Morphology: Type II acromion. No subacromial spur. Subacromial and subcoracoid space are normal.   -Bursa: No  subacromial or subcoracoid bursitis.    ACROMIOCLAVICULAR JOINT:   -Mild degenerative changes.    LONG HEAD OF BICEPS TENDON:   -No tendinopathy or tear. No tenosynovitis or subluxation.    GLENOHUMERAL JOINT:   Right total shoulder arthroplasty, producing artifact limiting evaluation. No significant joint effusion.     BONES:   Given limitations of artifact, marrow signal is unremarkable without evidence for osteomyelitis. No evidence for fracture.    SOFT TISSUES:   No significant soft tissue inflammatory changes. No fluid collection. Normal deltoid muscle bulk. Normal visualized chest wall and axilla.      Impression    IMPRESSION:  1.  Right total shoulder arthroplasty. No joint effusion. No inflammatory change or abscess. No other gross abnormality within the limitations of artifact.       Medications     sodium chloride 125 mL/hr at 05/04/23 0628       ampicillin-sulbactam  3 g Intravenous Q6H     sodium chloride (PF)  3 mL Intracatheter Q8H     vancomycin  1,500 mg Intravenous Q18H

## 2023-05-04 NOTE — PROGRESS NOTES
[facility]    Referral received  from Carie Knowles RN CM, for potential IV antibiotics at discharge.    Benefits verified. Pt has coverage for IV antibiotics through his [employer] plan.  He has currently met his deductible of $150.  He has 75/25 coverage until his out of pocket of $4028 is met (has met $877 so far).  Once the out of pocket is met, he should be covered at 100%.    Should patient require IV antibiotics at discharge, writer will speak with pt to review benefits, home infusion and to offer choice of agency/home infusion provider.    Thank you for the referral.    Mary Zhao RN, BSN  [facility] Liaison  [phone] (Mon through Fri, 8:00 am-5:00 pm)  [phone] (Office)

## 2023-05-04 NOTE — PROGRESS NOTES
Nurse contacted lab regarding several blood culture labs that were to be drawn twice today at 1030 and 1300. Lab was notified that they werent drawn at all today or that they were still outstanding. Lab will look into it and send someone up

## 2023-05-04 NOTE — PLAN OF CARE
Goal Outcome Evaluation:      Problem: Plan of Care - These are the overarching goals to be used throughout the patient stay.    Goal: Plan of Care Review  Description: The Plan of Care Review/Shift note should be completed every shift.  The Outcome Evaluation is a brief statement about your assessment that the patient is improving, declining, or no change.  This information will be displayed automatically on your shift note.  Outcome: Progressing     Problem: Pain Acute  Goal: Optimal Pain Control and Function  Outcome: Progressing    Patient reports back and right shoulder pain, controlled with prn Norco.  Patient up independent.  Vitals stable.  NPO since midnight.

## 2023-05-04 NOTE — PLAN OF CARE
Patient arrived to floor at 1300. A & O x4, independent. C/O pain with activity at 7/10 in right shoulder, decreased ROM. 1 tab Norco given. IV Rocephin given. Ate 100% regular diet. Family visiting in room.

## 2023-05-04 NOTE — PROGRESS NOTES
"Orthopedic Progress Note      Assessment:    Right ankle and shoulder pain, initial encounter  Dog bite left hand  Splenectomy history    Plan:   - Continue PT/OT  - Weightbearing status: WBAT  - Aspiration right shoulder yesterday, NGTD on cultures  - Blood cultures x2 positive for gram negative bacilli  - Spoke with Dr. Ansari today as there is no clinical effusion or abscess on imaging.  His range of motion is slightly decreased today versus yesterday although still no erythema, swelling, fluctuance over the shoulder joint today.  We will continue to clinically watch at this point.    - regular diet today, NPO at midnight in case for procedure tomorrow  - Discharge planning: pending medical progression      Subjective:  Pain: Moderate pain to the right shoulder, no pain to the right ankle  Chest pain, SOB: No  Nausea, Vomiting:  No  Lightheadedness, Dizziness:  No  Neuro:  Patient denies new onset numbness or paresthesias    Patient reports that today he is having more shoulder pain than ankle pain.  He notes that yesterday he was able to range his shoulder more than he is today.  There is also more pain and stiffness within the shoulder joint that he is describing.  No new numbness tingling down the arm or in the foot.  No nausea vomiting, chest pain shortness of breath.    Objective:  /78   Pulse 57   Temp 97.6  F (36.4  C) (Oral)   Resp 20   Ht 1.803 m (5' 11\")   Wt 108.9 kg (240 lb)   SpO2 96%   BMI 33.47 kg/m    The patient is A&Ox3. Appears comfortable.   Sensation is intact to bilateral upper and lower extremities.  There is maybe minimal erythema over the right shoulder.  No swelling noted no fluctuance.  There is tenderness along the anterior aspect of the shoulder primarily along the bicipital groove.  Range of motion of the right shoulder anterior flexion is 0 to 45 degrees, lateral flexion is 0 to 45 degrees  Dorsiflexion and plantar flexion is intact.  Dorsalis pedis and radial pulse " intact.  Calves are soft and non-tender. Negative Darrel's.      Pertinent Labs   Lab Results: personally reviewed.   No results found for: INR, PROTIME  Lab Results   Component Value Date    WBC 13.3 (H) 05/04/2023    HGB 12.6 (L) 05/04/2023    HCT 37.9 (L) 05/04/2023     (H) 05/04/2023     05/04/2023     Lab Results   Component Value Date     05/03/2023    CO2 22 05/03/2023         Report completed by:  Gisela Cherry PA-C/Dr. Ansari  [facility]    Date: 5/4/2023  Time: 11:22 AM

## 2023-05-04 NOTE — PROGRESS NOTES
Patient A&O x4 and pleasant. VSS on RA. Pain is minimal, pt refused interventions. Patient ambulates independently. PIV is patent and saline locked. Tolerating oral intake and voiding appropriately. Last BM on 5/4. Patient up in room. Wife at bedside. K and Mag protocols run, AM rechecks for both. New rash assessed on back of neck and shoulder. Outlined with skin marker. ID paged for heads up. Pt denies itching or burning. Report given and pt transferred to unit.    Allyssa Ramires RN

## 2023-05-05 ENCOUNTER — APPOINTMENT (OUTPATIENT)
Dept: ULTRASOUND IMAGING | Facility: CLINIC | Age: 53
DRG: 872 | End: 2023-05-05
Payer: OTHER GOVERNMENT

## 2023-05-05 LAB
A PHAGOCYTOPH DNA BLD QL NAA+PROBE: NOT DETECTED
ALBUMIN SERPL-MCNC: 2.8 G/DL (ref 3.5–5)
ALP SERPL-CCNC: 50 U/L (ref 45–120)
ALT SERPL W P-5'-P-CCNC: 38 U/L (ref 0–45)
ANION GAP SERPL CALCULATED.3IONS-SCNC: 6 MMOL/L (ref 5–18)
AST SERPL W P-5'-P-CCNC: 28 U/L (ref 0–40)
B MICROTI DNA BLD QL NAA+PROBE: NOT DETECTED
BABESIA DNA BLD QL NAA+PROBE: NOT DETECTED
BILIRUB SERPL-MCNC: 0.6 MG/DL (ref 0–1)
BUN SERPL-MCNC: 16 MG/DL (ref 8–22)
CALCIUM SERPL-MCNC: 8.5 MG/DL (ref 8.5–10.5)
CHLORIDE BLD-SCNC: 111 MMOL/L (ref 98–107)
CO2 SERPL-SCNC: 22 MMOL/L (ref 22–31)
CREAT SERPL-MCNC: 0.82 MG/DL (ref 0.7–1.3)
E CHAFFEENSIS DNA BLD QL NAA+PROBE: NOT DETECTED
E EWINGII DNA SPEC QL NAA+PROBE: NOT DETECTED
EHRLICHIA DNA SPEC QL NAA+PROBE: NOT DETECTED
ERYTHROCYTE [DISTWIDTH] IN BLOOD BY AUTOMATED COUNT: 14 % (ref 10–15)
GFR SERPL CREATININE-BSD FRML MDRD: >90 ML/MIN/1.73M2
GLUCOSE BLD-MCNC: 105 MG/DL (ref 70–125)
HCT VFR BLD AUTO: 37.8 % (ref 40–53)
HGB BLD-MCNC: 12.7 G/DL (ref 13.3–17.7)
MAGNESIUM SERPL-MCNC: 1.9 MG/DL (ref 1.8–2.6)
MCH RBC QN AUTO: 33.2 PG (ref 26.5–33)
MCHC RBC AUTO-ENTMCNC: 33.6 G/DL (ref 31.5–36.5)
MCV RBC AUTO: 99 FL (ref 78–100)
PLATELET # BLD AUTO: 200 10E3/UL (ref 150–450)
POTASSIUM BLD-SCNC: 4.2 MMOL/L (ref 3.5–5)
PROT SERPL-MCNC: 6.2 G/DL (ref 6–8)
RBC # BLD AUTO: 3.82 10E6/UL (ref 4.4–5.9)
SODIUM SERPL-SCNC: 139 MMOL/L (ref 136–145)
VANCOMYCIN SERPL-MCNC: <3 MG/L
WBC # BLD AUTO: 9.6 10E3/UL (ref 4–11)

## 2023-05-05 PROCEDURE — 120N000001 HC R&B MED SURG/OB

## 2023-05-05 PROCEDURE — 250N000011 HC RX IP 250 OP 636: Performed by: INTERNAL MEDICINE

## 2023-05-05 PROCEDURE — 85014 HEMATOCRIT: CPT | Performed by: INTERNAL MEDICINE

## 2023-05-05 PROCEDURE — 80202 ASSAY OF VANCOMYCIN: CPT | Performed by: INTERNAL MEDICINE

## 2023-05-05 PROCEDURE — 36415 COLL VENOUS BLD VENIPUNCTURE: CPT | Performed by: INTERNAL MEDICINE

## 2023-05-05 PROCEDURE — 99232 SBSQ HOSP IP/OBS MODERATE 35: CPT | Performed by: INTERNAL MEDICINE

## 2023-05-05 PROCEDURE — 99232 SBSQ HOSP IP/OBS MODERATE 35: CPT | Performed by: EMERGENCY MEDICINE

## 2023-05-05 PROCEDURE — 250N000013 HC RX MED GY IP 250 OP 250 PS 637: Performed by: INTERNAL MEDICINE

## 2023-05-05 PROCEDURE — 250N000013 HC RX MED GY IP 250 OP 250 PS 637: Performed by: EMERGENCY MEDICINE

## 2023-05-05 PROCEDURE — 258N000003 HC RX IP 258 OP 636: Performed by: INTERNAL MEDICINE

## 2023-05-05 PROCEDURE — 87798 DETECT AGENT NOS DNA AMP: CPT

## 2023-05-05 PROCEDURE — 83735 ASSAY OF MAGNESIUM: CPT | Performed by: INTERNAL MEDICINE

## 2023-05-05 PROCEDURE — 76882 US LMTD JT/FCL EVL NVASC XTR: CPT | Mod: RT

## 2023-05-05 PROCEDURE — 80053 COMPREHEN METABOLIC PANEL: CPT | Performed by: INTERNAL MEDICINE

## 2023-05-05 PROCEDURE — 250N000013 HC RX MED GY IP 250 OP 250 PS 637: Performed by: HOSPITALIST

## 2023-05-05 RX ORDER — LOSARTAN POTASSIUM 50 MG/1
50 TABLET ORAL DAILY
Status: DISCONTINUED | OUTPATIENT
Start: 2023-05-06 | End: 2023-05-07

## 2023-05-05 RX ORDER — CEFTRIAXONE 2 G/1
2 INJECTION, POWDER, FOR SOLUTION INTRAMUSCULAR; INTRAVENOUS EVERY 24 HOURS
Status: DISCONTINUED | OUTPATIENT
Start: 2023-05-05 | End: 2023-05-07

## 2023-05-05 RX ORDER — VALACYCLOVIR HYDROCHLORIDE 1 G/1
1000 TABLET, FILM COATED ORAL 3 TIMES DAILY
Status: DISCONTINUED | OUTPATIENT
Start: 2023-05-05 | End: 2023-05-07 | Stop reason: HOSPADM

## 2023-05-05 RX ADMIN — HYDROCODONE BITARTRATE AND ACETAMINOPHEN 2 TABLET: 5; 325 TABLET ORAL at 21:35

## 2023-05-05 RX ADMIN — HYDROCODONE BITARTRATE AND ACETAMINOPHEN 2 TABLET: 5; 325 TABLET ORAL at 03:03

## 2023-05-05 RX ADMIN — SODIUM CHLORIDE: 9 INJECTION, SOLUTION INTRAVENOUS at 00:14

## 2023-05-05 RX ADMIN — HYDROCODONE BITARTRATE AND ACETAMINOPHEN 2 TABLET: 5; 325 TABLET ORAL at 15:21

## 2023-05-05 RX ADMIN — AMPICILLIN SODIUM AND SULBACTAM SODIUM 3 G: 2; 1 INJECTION, POWDER, FOR SOLUTION INTRAMUSCULAR; INTRAVENOUS at 15:52

## 2023-05-05 RX ADMIN — VALACYCLOVIR HYDROCHLORIDE 1000 MG: 1 TABLET, FILM COATED ORAL at 21:35

## 2023-05-05 RX ADMIN — Medication 3 MG: at 21:35

## 2023-05-05 RX ADMIN — AMPICILLIN SODIUM AND SULBACTAM SODIUM 3 G: 2; 1 INJECTION, POWDER, FOR SOLUTION INTRAMUSCULAR; INTRAVENOUS at 21:34

## 2023-05-05 RX ADMIN — CEFTRIAXONE SODIUM 2 G: 2 INJECTION, POWDER, FOR SOLUTION INTRAMUSCULAR; INTRAVENOUS at 13:26

## 2023-05-05 RX ADMIN — AMPICILLIN SODIUM AND SULBACTAM SODIUM 3 G: 2; 1 INJECTION, POWDER, FOR SOLUTION INTRAMUSCULAR; INTRAVENOUS at 02:56

## 2023-05-05 RX ADMIN — AMPICILLIN SODIUM AND SULBACTAM SODIUM 3 G: 2; 1 INJECTION, POWDER, FOR SOLUTION INTRAMUSCULAR; INTRAVENOUS at 09:44

## 2023-05-05 RX ADMIN — VALACYCLOVIR HYDROCHLORIDE 1000 MG: 1 TABLET, FILM COATED ORAL at 15:51

## 2023-05-05 RX ADMIN — LOSARTAN POTASSIUM 25 MG: 25 TABLET, FILM COATED ORAL at 09:44

## 2023-05-05 ASSESSMENT — ACTIVITIES OF DAILY LIVING (ADL)
ADLS_ACUITY_SCORE: 22

## 2023-05-05 NOTE — PROGRESS NOTES
[facility]    Update to pt's home infusion coverage, should he require home IV antibiotics:    Benefits verified. Pt has coverage for IV antibiotics through his [employer] plan.  He has currently met his deductible of $150.  He has 75/25 coverage until his out of pocket of $4028 is met (has met $877 so far).   Pt, however, also has a [employer] Supplementary plan.  He has a $100 deductible to meet.  Once this deductible is met, he will be covered at 100% between his primary  plan and this supplemental plan.      Mary Zhao RN, BSN  [facility] Liaison  [phone] (Mon through Fri, 8:00 am-5:00 pm)  [phone] (Office)

## 2023-05-05 NOTE — PLAN OF CARE
Patient continues to have mild pain at rest and mod/severe pain when moving his arm. Pain is localized to right shoulder. Further decrease in ROM. Shoulder rash pustules are blisters with serous fluid. No change in size of rash, localized in right upper shoulder/neck area. Many questions answered, discussions occurred with providers regarding plan of care. Continued IV ABX given. NPO throughout the morning, decided to eat in early afternoon, as [place] surgery or washout likely will not happen today.

## 2023-05-05 NOTE — PROGRESS NOTES
"Orthopedic Progress Note      Assessment:    Right ankle and shoulder pain, initial encounter  Dog bite left hand  Splenectomy history    Plan:   - Continue PT/OT  - Weightbearing status: WBAT  - Aspiration right shoulder 5/2, NGTD on cultures  - Blood cultures x2 positive for gram negative bacilli  - Development of new rash, appears pustular in nature. Question Shingles along C4 distribution. This would explain his Shoulder pain as well  - Regular diet  - Discharge planning: pending medical progression    Subjective:  Pain: moderate  Chest pain, SOB: no  Nausea, Vomiting:  no  Lightheadedness, Dizziness:  no  Neuro:  Patient denies new onset numbness or paresthesias    Patient  reports that today he is having more ripping shoulder pain than ankle pain.  He notes that yesterday he was able to range his shoulder more than he is today.  There is also more pain and stiffness within the shoulder joint that he is describing.  No new numbness tingling down the arm or in the foot.  No nausea vomiting, chest pain shortness of breath    Objective:  BP (!) 156/89 (BP Location: Left arm)   Pulse 53   Temp 97.6  F (36.4  C) (Oral)   Resp 18   Ht 1.803 m (5' 11\")   Wt 108.9 kg (240 lb)   SpO2 97%   BMI 33.47 kg/m    The patient is A&Ox3. Appears comfortable.   Sensation is intact to bilateral upper and lower extremities.  There is maybe minimal erythema over the right shoulder.  No swelling noted no fluctuance.  There is tenderness along the anterior aspect of the shoulder primarily along the bicipital groove.  Range of motion of the right shoulder anterior flexion is 0 to 45 degrees, lateral flexion is 0 to 45 degrees  Macular, papular rash with pustules along C4 distribution. With surrounding erythema.   Dorsiflexion and plantar flexion is intact.  Dorsalis pedis and radial pulse intact.  Calves are soft and non-tender. Negative Darrel's.    Pertinent Labs   Lab Results: personally reviewed.   No results found for: INR, " PROTIME  Lab Results   Component Value Date    WBC 9.6 05/05/2023    HGB 12.7 (L) 05/05/2023    HCT 37.8 (L) 05/05/2023    MCV 99 05/05/2023     05/05/2023     Lab Results   Component Value Date     05/05/2023    CO2 22 05/05/2023         Report completed by:  Gisela Cherry PA-C/[facility]    Date: 5/5/2023  Time: 9:37 AM

## 2023-05-05 NOTE — PLAN OF CARE
Problem: Pain Acute  Goal: Optimal Pain Control and Function  Outcome: Progressing  Intervention: Develop Pain Management Plan  Recent Flowsheet Documentation  Taken 5/5/2023 0303 by Reema Woodson RN  Pain Management Interventions: medication (see MAR)  Intervention: Prevent or Manage Pain  Recent Flowsheet Documentation  Taken 5/5/2023 0000 by Reema Woodson, RN  Medication Review/Management: medications reviewed   Goal Outcome Evaluation: Alert. Rash marked, looking better from report, less dark, within borders. Pain in arm managed with norco. ROM in shoulder limited by pain. IVF, IV antibiotics. Patient would like to have an in depth conversation with an ortho MD today, he is very concerned about his inability to work and the sudden onset of his problems.

## 2023-05-05 NOTE — PROGRESS NOTES
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  Acute febrile illness with arthralgias notably right shoulder and ankle, body aches, rash, transient diarrhea, that followed a superficial dog bite to the finger>>all can be explained by capnocytophaga bacteremia    While there is no evidence of finger related cellulitis or tenosynovitis it is possible this active as a portal of entry for a disseminated bacterial infection  Leukocytosis is consistent with bacterial infection  History of splenectomy for spherocytosis- puts hism at risk for capno infectin    History shoulder replacement; MRI OK; passive motion OK; improving  Ankle discomfort improving as well    Diarrhea better- cdiff negative    Leukocytosis , resolved  Fever down    S/p shoulder tap; not much ; MRI unremarkable; not likley septic arthritis; perhaps synovitis         Rashes, various morphologies; pustular neck; vesicles not obvious today more papular   urticaria like subtle trunk; etc>>better so likely from capno  intertrigo     Plan  All syx better  Continue Unasyn and CTX  Need to show negative BC from 4th : if positive check TTE  Sunday : if shoulder much better and neg cyltures can discharge on 4 weeks total Augmentin  If shoulder is still a concern proceed with IV Abx    Noted concern for Zoster and valtrex inititation pending VZV PCR  Topical azole    Rc Mancuso M.D.    _______________________________________________________    Interval History   Feels better    Can stand better after a while (initial stiffness)  Had shoulder tapped, moves better    Physical Exam   Vital Signs: Temp: 97.6  F (36.4  C) Temp src: Oral BP: (!) 156/89 (RN Present) Pulse: 53   Resp: 18 SpO2: 97 % O2 Device: None (Room air)    Weight: 240 lbs 0 oz  Gen. appearance nontoxic  Eyes no conjunctivitis or icterus  Neck no stiffness or neck vein  distention, no LN  Papular rash  Abdomen soft not tender  Extremities passive motion of shoulder OK; active motion difficult but better  Can stand (after stiffness resolved  Skin  no rash or emboli  Neurologic alert oriented no focal deficits      Data   Results for orders placed or performed during the hospital encounter of 05/02/23 (from the past 24 hour(s))   MRSA MSSA PCR, Nasal Swab    Specimen: Nare, Right; Swab   Result Value Ref Range    MRSA Target DNA Negative Negative    SA Target DNA Negative     Narrative    The [employer]  Xpert SA Nasal Complete assay performed in the [employer]  Dx System is a qualitative in vitro diagnostic test designed for rapid detection of Staphylococcus aureus (SA) and methicillin-resistant Staphylococcus aureus (MRSA) from nasal swabs in patients at risk for nasal colonization. The test utilizes automated real-time polymerase chain reaction (PCR) to detect MRSA/SA DNA. The Xpert SA Nasal Complete assay is intended to aid in the prevention and control of MRSA/SA infections in healthcare settings. The assay is not intended to diagnose, guide or monitor treatment for MRSA/SA infections, or provide results of susceptibility to methicillin. A negative result does not preclude MRSA/SA nasal colonization.    Blood Culture Peripheral Blood    Specimen: Peripheral Blood   Result Value Ref Range    Culture No growth after 12 hours     Narrative    Only an Aerobic Blood Culture Bottle was collected, interpret results with caution.      Blood Culture Peripheral Blood    Specimen: Peripheral Blood   Result Value Ref Range    Culture No growth after 12 hours    Blood Culture Peripheral Blood    Specimen: Peripheral Blood   Result Value Ref Range    Culture No growth after 12 hours     Narrative    Only an Aerobic Blood Culture Bottle was collected, interpret results with caution.      Blood Culture Peripheral Blood    Specimen: Peripheral Blood   Result Value Ref Range    Culture No growth  after 12 hours    Magnesium   Result Value Ref Range    Magnesium 1.9 1.8 - 2.6 mg/dL   Vancomycin level   Result Value Ref Range    Vancomycin <3.0   mg/L   CBC with platelets   Result Value Ref Range    WBC Count 9.6 4.0 - 11.0 10e3/uL    RBC Count 3.82 (L) 4.40 - 5.90 10e6/uL    Hemoglobin 12.7 (L) 13.3 - 17.7 g/dL    Hematocrit 37.8 (L) 40.0 - 53.0 %    MCV 99 78 - 100 fL    MCH 33.2 (H) 26.5 - 33.0 pg    MCHC 33.6 31.5 - 36.5 g/dL    RDW 14.0 10.0 - 15.0 %    Platelet Count 200 150 - 450 10e3/uL   Comprehensive metabolic panel   Result Value Ref Range    Sodium 139 136 - 145 mmol/L    Potassium 4.2 3.5 - 5.0 mmol/L    Chloride 111 (H) 98 - 107 mmol/L    Carbon Dioxide (CO2) 22 22 - 31 mmol/L    Anion Gap 6 5 - 18 mmol/L    Urea Nitrogen 16 8 - 22 mg/dL    Creatinine 0.82 0.70 - 1.30 mg/dL    Calcium 8.5 8.5 - 10.5 mg/dL    Glucose 105 70 - 125 mg/dL    Alkaline Phosphatase 50 45 - 120 U/L    AST 28 0 - 40 U/L    ALT 38 0 - 45 U/L    Protein Total 6.2 6.0 - 8.0 g/dL    Albumin 2.8 (L) 3.5 - 5.0 g/dL    Bilirubin Total 0.6 0.0 - 1.0 mg/dL    GFR Estimate >90 >60 mL/min/1.73m2   US Upper Extremity Non Vascular Right    Narrative    EXAM: US UPPER EXTREMITY NON VASCULAR RIGHT  LOCATION: [facility]  DATE/TIME: 5/5/2023 12:35 PM CDT    INDICATION: Right shoulder arthroplasty with pain.  COMPARISON: Right shoulder MRI 05/03/2023 11:40 PM, right shoulder ultrasound 05/03/2023 11:46 AM, right shoulder radiograph 05/03/2023.  TECHNIQUE: Routine.    FINDINGS: Right shoulder negative for effusion.

## 2023-05-05 NOTE — PROGRESS NOTES
Patient and spouse frustrated regarding amount of unknown regarding blood cultures and treatment. Stay in the ED before coming to the floor. Antibiotics continued, walking in room and wiggins. Spouse mentioned the right shoulder rash looked much improved. Will be NPO at midnight with IV fluids for possible procedure.

## 2023-05-05 NOTE — PROGRESS NOTES
[facility]       Attestation:  I attest that I saw, examined, discussed the care of this patient with  Shanell.  He is a male who is on hospital day number 3 after arriving with joint  Pain, rigors.  Pains were most strongly in the right shoulder and right ankle.  (each joint is prosthetic).  Pt grew gram negative bacilli on the 1st day.  On   My phone call today with micro finalization of the culture is pending.  He developed  A rash on the right neck/shoulder area suspicious for herpes zoster. A viral  Culture was taken today.    MRI reviewed from yesterday; no fluid in joint.  Ultrasound  Done today is again without fluid in joint.  Pt had high concerns due to his moderately  High pain on range of motion with the right shoulder.     Medicine Progress Note - Hospitalist Service    Date of Admission:  5/2/2023    Assessment & Plan   Jose Ramon Gold is a 53 year old male admitted on 5/2/2023 with early sepsis. PMH includes HTN, s/h splenectomy, s/h total shoulder arthroplasty and ankle surgery. He presented with acute onset of fevers, chills, body aches, joint pain in entire body for seven days. Systemic symptoms have now resolved since being on antibiotics. However, joint pain still present primarily in right shoulder and right ankle where he had previous ortho surgeries. He states that his range of motion has decreased with shoulder during his hospital stay. Had dog bite on left hand at middle finger which occurred 9 days ago. Unremarkable vital signs. ID and ortho consulted. Currently on IV Unasyn and Ceftriaxone. Noticed new rash on right side of posterior neck and back yesterday. Varicella PCR sent.     1. Sepsis: due to slow growing gram negative bacilli;  Currently stable with improved leukocytosis at 9.6 and no systemic symptoms. Still experiencing pain and decreased active ROM in right shoulder joint. MRI of joint showed no effusion. Will allow ortho to determine plan with right  shoulder.   - Continue Unasyn and Ceftriaxone  - Awaiting blood cultures due to slow growing bacteria  - ID will follow-up    2. Rash: New papular erythematous rash on right side of posterior neck and shoulder noticed yesterday. Denies itchiness, pain. Thinking shingles, impetigo.   - Varicella PCR sent to determine if shingles.   - will start valacyclovir until culture results     3. HTN: Was hypotensive on admission. Losartan was being given at smaller dose with holding parameters until BP increased. Latest BP was 156/89. OK to give regular dose of Losartan.   - Continue Losartan/HCTZ     4. Acute Renal Failure: Has improved over hospital course with receiving IVF. Creatinine from 5/5 was 0.82 indicating improvement since yesterday.   - Continue IVF     5. Decreased EF on ECHO: Mild biatrial enlargement and RV enlargement  - Follow-up with Cardiology     6. Right lower lobe pulmonary nodule: 6 mm incidentally found on CT.   - Follow-up with PCP to repeat CT scan in 6-12 months         The patient's care was discussed with the Attending Physician, Dr. Tim.    KENNEDY Ramos    Hospitalist Service  [facility]  Securely message with [employer] (more info)  Text page via [facility] Paging/Directory   ______________________________________________________________________    Interval History   Still having right shoulder and right ankle pain. Progressively decreased range of motion with right shoulder. Denies previous recent injury. Denies fever, sweats, chills, body aches, dizziness, nausea, vomiting, abdominal pain, back pain today.    Physical Exam   Vital Signs: Temp: 97.6  F (36.4  C) Temp src: Oral BP: (!) 156/89 (RN Present) Pulse: 53   Resp: 18 SpO2: 97 % O2 Device: None (Room air)    Weight: 240 lbs 0 oz    General Appearance: Alert and conversant, sitting in a chair.  Respiratory: In no acute respiratory distress, CTAB.   Cardiovascular: Normal rate and rhythm. No abnormal heart  sounds.  Skin: Papular, erythematous rash on right side of posterior neck and shoulder. No crusting present.   MSK: Decreased Active ROM of right shoulder to 45 degrees. Decreased Passive ROM of right shoulder. No erythema, fluctuance, swelling or tenderness to right shoulder. Normal ROM of ankle with mild swelling.      Medical Decision Making       45 MINUTES SPENT BY ME on the date of service doing chart review, history, exam, documentation & further activities per the note.      Data   Na: 139   K: 4.2  Cl: 111  Ca: 8.5  Albumin: 2.8   Creatinine: 0.82     WBC: 9.6  Hgb: 12.7  Platelet: 200

## 2023-05-05 NOTE — PROGRESS NOTES
ORTHO:    Very pleasant 53-year-old male seen for follow-up of right shoulder pain.  Has a history of right total shoulder arthroplasty.  No issue with this prior to this past weekend.  Sustained a dog bite to the left hand last week which likely resulted in generalized sepsis over the weekend.  He and his wife report that he looked exceptionally ill over the weekend.  Presented and was started on appropriate antibiotics.  Had generalized ache in multiple joints but these seem to have improved as an inpatient as his general disposition is also improved on antibiotics.  Unfortunately right shoulder continues to ache.  He describes the pain is in the shoulder and reaches with his hand and covers his deltoid.    Pain in the shoulder has persisted, this morning a maculopapular rash erupted on his right lateral neck and trapezius towards the dorsum of the right shoulder.  Rash appears classic for likely C4 dermatome varicella zoster eruption.  This would explain his dorsal shoulder pain as well.    On exam: He has minimal swelling around the shoulder.  No pain with palpation over the actual glenohumeral joint either anterior or posterior.  No pain with gentle internal/external rotation of his shoulder.  Pain is all described as dorsal including the deltoid area.    Recent imaging including MRI on 5/4 shows no significant shoulder effusion.  No periarticular muscle edema to suggest infection.  Aspiration of the shoulder was not successful due to a paucity of fluid.      Impression:  1.  Sepsis secondary to left hand injury, resolving on antibiotics  2.  Shingles outbreak, right C4 dermatome causing right dorsal shoulder pain     Plan:  Had a nice discussion with he and his wife.  I see absolutely no evidence of an intra-articular process in his right shoulder.  There is nothing to suggest infection of his hardware deep.  He has no pain over the glenohumeral joint anterior or posterior.  All of his pain is dorsal  consistent with what appears to be an outbreak of shingles in the C4 dermatome.  We discussed this.  Answered their questions.  I agree with hospitalist's recommendation to start antiviral.  Hopefully this will improve his symptomatology in the shoulder as well as potentially shorten the course of the shingles outbreak.  Also discussed potential use of lidocaine patches on the dorsal shoulder as an outpatient.  We will continue to follow.  No surgical intervention planned for the right shoulder at this time.    Oswald Garrido MD

## 2023-05-06 LAB
BACTERIA BLD CULT: ABNORMAL
BACTERIA BLD CULT: ABNORMAL
MAGNESIUM SERPL-MCNC: 1.8 MG/DL (ref 1.8–2.6)
POTASSIUM BLD-SCNC: 4.2 MMOL/L (ref 3.5–5)
VZV DNA SPEC QL NAA+PROBE: DETECTED

## 2023-05-06 PROCEDURE — 99232 SBSQ HOSP IP/OBS MODERATE 35: CPT | Performed by: EMERGENCY MEDICINE

## 2023-05-06 PROCEDURE — 250N000013 HC RX MED GY IP 250 OP 250 PS 637: Performed by: EMERGENCY MEDICINE

## 2023-05-06 PROCEDURE — 250N000011 HC RX IP 250 OP 636: Performed by: INTERNAL MEDICINE

## 2023-05-06 PROCEDURE — 99232 SBSQ HOSP IP/OBS MODERATE 35: CPT | Performed by: STUDENT IN AN ORGANIZED HEALTH CARE EDUCATION/TRAINING PROGRAM

## 2023-05-06 PROCEDURE — 84132 ASSAY OF SERUM POTASSIUM: CPT | Performed by: EMERGENCY MEDICINE

## 2023-05-06 PROCEDURE — 83735 ASSAY OF MAGNESIUM: CPT | Performed by: EMERGENCY MEDICINE

## 2023-05-06 PROCEDURE — 250N000013 HC RX MED GY IP 250 OP 250 PS 637: Performed by: INTERNAL MEDICINE

## 2023-05-06 PROCEDURE — 120N000001 HC R&B MED SURG/OB

## 2023-05-06 PROCEDURE — 36415 COLL VENOUS BLD VENIPUNCTURE: CPT | Performed by: EMERGENCY MEDICINE

## 2023-05-06 RX ADMIN — AMPICILLIN SODIUM AND SULBACTAM SODIUM 3 G: 2; 1 INJECTION, POWDER, FOR SOLUTION INTRAMUSCULAR; INTRAVENOUS at 16:14

## 2023-05-06 RX ADMIN — VALACYCLOVIR HYDROCHLORIDE 1000 MG: 1 TABLET, FILM COATED ORAL at 21:08

## 2023-05-06 RX ADMIN — ACETAMINOPHEN 650 MG: 325 TABLET ORAL at 22:00

## 2023-05-06 RX ADMIN — AMPICILLIN SODIUM AND SULBACTAM SODIUM 3 G: 2; 1 INJECTION, POWDER, FOR SOLUTION INTRAMUSCULAR; INTRAVENOUS at 03:35

## 2023-05-06 RX ADMIN — CEFTRIAXONE SODIUM 2 G: 2 INJECTION, POWDER, FOR SOLUTION INTRAMUSCULAR; INTRAVENOUS at 13:38

## 2023-05-06 RX ADMIN — VALACYCLOVIR HYDROCHLORIDE 1000 MG: 1 TABLET, FILM COATED ORAL at 09:17

## 2023-05-06 RX ADMIN — VALACYCLOVIR HYDROCHLORIDE 1000 MG: 1 TABLET, FILM COATED ORAL at 13:38

## 2023-05-06 RX ADMIN — AMPICILLIN SODIUM AND SULBACTAM SODIUM 3 G: 2; 1 INJECTION, POWDER, FOR SOLUTION INTRAMUSCULAR; INTRAVENOUS at 21:53

## 2023-05-06 RX ADMIN — AMPICILLIN SODIUM AND SULBACTAM SODIUM 3 G: 2; 1 INJECTION, POWDER, FOR SOLUTION INTRAMUSCULAR; INTRAVENOUS at 09:27

## 2023-05-06 RX ADMIN — LOSARTAN POTASSIUM 50 MG: 50 TABLET, FILM COATED ORAL at 09:17

## 2023-05-06 RX ADMIN — ACETAMINOPHEN 650 MG: 325 TABLET ORAL at 13:48

## 2023-05-06 ASSESSMENT — ACTIVITIES OF DAILY LIVING (ADL)
ADLS_ACUITY_SCORE: 22

## 2023-05-06 NOTE — PLAN OF CARE
Problem: Pain Acute  Goal: Optimal Pain Control and Function  Outcome: Progressing   Goal Outcome Evaluation:       Patient says he has minimal pain at dog bite site, declines wanting any medication for pain during shift. Alert and oriented, independent in room. Given IV antibiotic on shift.

## 2023-05-06 NOTE — PROGRESS NOTES
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  Acute febrile illness with arthralgias notably right shoulder and ankle, body aches, rash, transient diarrhea, that followed a superficial dog bite to the finger>>all can be explained by capnocytophaga bacteremia    While there is no evidence of finger related cellulitis or tenosynovitis it is possible this active as a portal of entry for a disseminated bacterial infection  Leukocytosis is consistent with bacterial infection  History of splenectomy for spherocytosis- puts hism at risk for capno infectin    History shoulder replacement; MRI OK; passive motion OK; improving  Ankle discomfort improving as well    Diarrhea better- cdiff negative    Leukocytosis , resolved  Fever down    S/p shoulder tap; not much ; MRI unremarkable; not likley septic arthritis; perhaps synovitis         Rashes, various morphologies; pustular neck.  History of chickenpox.  Lesions stopped on the midline.  HSV/VZV PCR in process.  On valacyclovir.       Plan  All syx better  Continue Unasyn and CTX  Hoping to have susceptibility back tomorrow to change to Augmentin if susceptible.  Continue valacyclovir.  Follow-up pending HSV/VZV PCR.    Discussed with the patient, wife, and nursing staff.    ID will follow.      Joaquin Tierney MD      _______________________________________________________    Interval History   Overall feels much better.  Has vesicular rash on the neck on the contralateral side of the dog bite.    Can stand better after a while (initial stiffness)  Had shoulder tapped, moves better    Physical Exam   Vital Signs: Temp: 97.3  F (36.3  C) Temp src: Oral BP: (!) 154/88 Pulse: 60   Resp: 16 SpO2: 98 % O2 Device: None (Room air)    Weight: 249 lbs 9.6 oz  Gen. appearance nontoxic  Eyes no conjunctivitis or icterus  Neck no stiffness or neck vein  distention, no LN  Papular rash  Abdomen soft not tender  Extremities passive motion of shoulder OK; active motion difficult but better  Can stand (after stiffness resolved  Skin  no rash or emboli  Neurologic alert oriented no focal deficits      Data   Results for orders placed or performed during the hospital encounter of 05/02/23 (from the past 24 hour(s))   US Upper Extremity Non Vascular Right    Narrative    EXAM: US UPPER EXTREMITY NON VASCULAR RIGHT  LOCATION: [facility]  DATE/TIME: 5/5/2023 12:35 PM CDT    INDICATION: Right shoulder arthroplasty with pain.  COMPARISON: Right shoulder MRI 05/03/2023 11:40 PM, right shoulder ultrasound 05/03/2023 11:46 AM, right shoulder radiograph 05/03/2023.  TECHNIQUE: Routine.    FINDINGS: Right shoulder negative for effusion.     Potassium   Result Value Ref Range    Potassium 4.2 3.5 - 5.0 mmol/L   Magnesium   Result Value Ref Range    Magnesium 1.8 1.8 - 2.6 mg/dL

## 2023-05-06 NOTE — PROGRESS NOTES
"Orthopedic Progress Note      Assessment:    Right ankle and shoulder pain, initial encounter  Dog bite left hand  Splenectomy history    Plan:   - Continue PT/OT  - Weightbearing status: WBAT  - Aspiration right shoulder 5/2, NGTD on cultures  - Blood cultures x2 positive for gram negative bacilli  - ID following, appreciate recs: Unasyn and CTX, 4 weeks total of Augmentin (IF shoulder still concern on Sunday 5/7 proceed with IV abx)  - Development of new rash, appears pustular in nature. Question Shingles along C4 distribution. This would explain his Shoulder pain as well  - Regular diet  - Discharge planning: pending medical progression    Subjective:  Pain: moderate  Chest pain, SOB: no  Nausea, Vomiting:  no  Lightheadedness, Dizziness:  no  Neuro:  Patient denies new onset numbness or paresthesias    Patient reports that his shoulder is improving today. He reports increased ROM, but is still having stiffness. No new numbness tingling down the arm or in the foot.  No nausea vomiting, chest pain shortness of breath    Objective:  BP (!) 140/78 (BP Location: Left arm)   Pulse 58   Temp 97.3  F (36.3  C) (Oral)   Resp 16   Ht 1.803 m (5' 11\")   Wt 113.2 kg (249 lb 9.6 oz)   SpO2 97%   BMI 34.81 kg/m    The patient is A&Ox3. Appears comfortable.   Sensation is intact to bilateral upper and lower extremities.  There is maybe minimal erythema over the right shoulder.  No swelling noted no fluctuance.  There is tenderness along the anterior aspect of the shoulder primarily along the bicipital groove.  Range of motion of the right shoulder anterior flexion is 0 to 90 degrees, lateral flexion is 0 to 60 degrees  Macular, papular rash with pustules along C4 distribution. With surrounding erythema.   Dorsiflexion and plantar flexion is intact.  Dorsalis pedis and radial pulse intact.  Calves are soft and non-tender. Negative Darrel's.    Pertinent Labs   Lab Results: personally reviewed.   No results found for: INR, " PROTIME  Lab Results   Component Value Date    WBC 9.6 05/05/2023    HGB 12.7 (L) 05/05/2023    HCT 37.8 (L) 05/05/2023    MCV 99 05/05/2023     05/05/2023     Lab Results   Component Value Date     05/05/2023    CO2 22 05/05/2023         Report completed by:  Caridad Meza PA-C , EMMA/Dr. [facility]    Date: 5/5/2023  Time: 9:37 AM

## 2023-05-06 NOTE — PLAN OF CARE
Patient A & O x4, independent, able to make needs known. VSS. Lab result: positive for Shingles (Varicella), contact precautions added, education provided. Skin rash on right neck partially crusted over, some blisters still serous filled. Rash area covered with guaze pad and tape. IV Unasyn and Rocephin and PO Valacyclovir given. C/O Headache at 4/10, Tylenol given.     Anticipating discharging as soon as blood culture sensitivities come back.

## 2023-05-06 NOTE — PROGRESS NOTES
Care Management Follow Up    Length of Stay (days): 3    Expected Discharge Date: 05/07/2023     Concerns to be Addressed: IV antibiotics, labs, ortho and ID following        Anticipated Discharge Disposition: Home (with wife)  Disposition Comments: Anticipate D/C home with wife; no CM needs if PO abx. CM follow for final ID plan. Wife to transport.  Anticipated Discharge Services:  (final ID plan)  Anticipated Discharge DME:  (TBD)    Patient/family educated on Medicare website which has current facility and service quality ratings:  (referral sent to [facility] to review benefits just incase IV abx are indicated at hospital discharge)  Education Provided on the Discharge Plan:    Patient/Family in Agreement with the Plan: yes    Referrals Placed by CM/SW: Home Infusion  Private pay costs discussed: None discussed at this time.     Additional Information:  Chart reviewed.     CM met with patient and wife. CM spoke with [facility] and confirmation of updated benefit check with supplemental insurance plan.   No CM needs if discharges home on PO antibiotics.   Preference for home infusion if IV antibiotics indicated; [facility] is following.   Wife will transport home.       Carie Knowles RN

## 2023-05-06 NOTE — PROGRESS NOTES
[facility] MEDICINE PROGRESS NOTE      Summary: 53 year old male into [facility] on 5/2/2023 after presenting with  Arthralgias, fevers.  PMHx includes splenectomy for hereditary spherocytosis.  Pt's  Blood cultures were positive on first day of incubation.   Verigene panel was negative.  Today he has capnocytophaga confirmed.  Sensitivities pending.    Overnight he was started on valacyclovir for likely right sided zoster.  Today the swab  Confirms presence of herpes virus.  On interview he feels 30% improved in the pain  Around the shoulder. He feels improved.      Afebrile overnight.     Hospital day number 4    Problem list:    1.  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  Herpes zoster: right C4 dermatome; confirmed with viral swab; valacyclovir   Started on 5/5/2023  3.  HTN: losartan/HCTZ  100-12.5  4.  History of splenectomy: vaccines per outpatient clinic  5.  dvt prevention:  ambulate        Alice Tim MD  [facility]  [facility]  Phone: #[phone]  Securely message with the Vocera Web Console (learn more here)  Text page via [employer] Paging/Directory     Interval History/Subjective:  See above; improved; afebrile      Physical Exam/Objective:  Temp:  [97.3  F (36.3  C)-98.2  F (36.8  C)] 97.3  F (36.3  C)  Pulse:  [58-63] 60  Resp:  [16-20] 16  BP: (140-154)/(78-89) 154/88  SpO2:  [97 %-98 %] 98 %  Body mass index is 34.81 kg/m .    GENERAL:  Alert, smiling   HEAD:  Normocephalic, without obvious abnormality, atraumatic   EYES:  PERRL, conjunctiva/corneas clear, no scleral icterus, EOM's intact   NOSE: Nares normal, septum midline, mucosa normal, no drainage   THROAT: Lips, mucosa, and tongue normal; teeth and gums normal, mouth moist   NECK: Supple, symmetrical, trachea midline   BACK:   Symmetric, no curvature, ROM  normal   LUNGS:   Clear to auscultation bilaterally, no rales, rhonchi, or wheezing, symmetric chest rise on inhalation, respirations unlabored   CHEST WALL:  No tenderness or deformity   HEART:  Regular rate and rhythm, S1 and S2 normal, no murmur, rub, or gallop    ABDOMEN:   Soft, non-tender, bowel sounds active all four quadrants, no masses, no organomegaly, no rebound or guarding   EXTREMITIES: Extremities normal, atraumatic, no cyanosis or edema    SKIN: Right C4 dermatome with zoster rash; more vesicular than yesterday   NEURO: Alert, oriented x3, moves all four extremities freely   PSYCH: Cooperative, behavior is appropriate      Data reviewed today: I personally reviewed all new medications, labs, imaging/diagnostics reports over the past 24 hours. Pertinent findings include:    Imaging:   No results found for this or any previous visit (from the past 24 hour(s)).    Labs:  Most Recent 3 BMP's:Recent Labs   Lab Test 05/06/23  0921 05/05/23  0828 05/04/23  0619 05/03/23  0716 05/02/23  1907   NA  --  139  --  136 132*   POTASSIUM 4.2 4.2 4.2 4.2 4.3   CHLORIDE  --  111*  --  109* 98   CO2  --  22  --  22 19*   BUN  --  16  --  26* 31*   CR  --  0.82 0.87 1.23 1.50*   ANIONGAP  --  6  --  5 15   MEHRAN  --  8.5  --  8.3* 9.2   GLC  --  105  --  98 105       Medications:   Personally Reviewed.  Medications     sodium chloride 75 mL/hr at 05/05/23 0014       ampicillin-sulbactam  3 g Intravenous Q6H     cefTRIAXone  2 g Intravenous Q24H     losartan  50 mg Oral Daily     sodium chloride (PF)  3 mL Intracatheter Q8H     valACYclovir  1,000 mg Oral TID

## 2023-05-07 VITALS
BODY MASS INDEX: 34.94 KG/M2 | OXYGEN SATURATION: 94 % | HEIGHT: 71 IN | HEART RATE: 58 BPM | TEMPERATURE: 97.7 F | RESPIRATION RATE: 18 BRPM | DIASTOLIC BLOOD PRESSURE: 90 MMHG | SYSTOLIC BLOOD PRESSURE: 151 MMHG | WEIGHT: 249.6 LBS

## 2023-05-07 PROBLEM — B02.9 HERPES ZOSTER WITHOUT COMPLICATION: Status: ACTIVE | Noted: 2023-05-07

## 2023-05-07 LAB
B BURGDOR DNA SPEC QL NAA+PROBE: NOT DETECTED
MAGNESIUM SERPL-MCNC: 2 MG/DL (ref 1.8–2.6)
POTASSIUM BLD-SCNC: 4 MMOL/L (ref 3.5–5)

## 2023-05-07 PROCEDURE — 250N000011 HC RX IP 250 OP 636: Performed by: INTERNAL MEDICINE

## 2023-05-07 PROCEDURE — 99207 PR NO BILLABLE SERVICE THIS VISIT: CPT | Performed by: EMERGENCY MEDICINE

## 2023-05-07 PROCEDURE — 250N000013 HC RX MED GY IP 250 OP 250 PS 637: Performed by: EMERGENCY MEDICINE

## 2023-05-07 PROCEDURE — 84132 ASSAY OF SERUM POTASSIUM: CPT | Performed by: EMERGENCY MEDICINE

## 2023-05-07 PROCEDURE — 36415 COLL VENOUS BLD VENIPUNCTURE: CPT | Performed by: EMERGENCY MEDICINE

## 2023-05-07 PROCEDURE — 250N000011 HC RX IP 250 OP 636: Performed by: EMERGENCY MEDICINE

## 2023-05-07 PROCEDURE — 83735 ASSAY OF MAGNESIUM: CPT | Performed by: EMERGENCY MEDICINE

## 2023-05-07 PROCEDURE — 99232 SBSQ HOSP IP/OBS MODERATE 35: CPT | Performed by: STUDENT IN AN ORGANIZED HEALTH CARE EDUCATION/TRAINING PROGRAM

## 2023-05-07 PROCEDURE — 99238 HOSP IP/OBS DSCHRG MGMT 30/<: CPT | Performed by: EMERGENCY MEDICINE

## 2023-05-07 PROCEDURE — 250N000013 HC RX MED GY IP 250 OP 250 PS 637: Performed by: INTERNAL MEDICINE

## 2023-05-07 RX ORDER — PROCHLORPERAZINE MALEATE 10 MG
10 TABLET ORAL EVERY 6 HOURS PRN
Qty: 20 TABLET | Refills: 0 | Status: SHIPPED | OUTPATIENT
Start: 2023-05-07

## 2023-05-07 RX ORDER — LOSARTAN POTASSIUM 50 MG/1
50 TABLET ORAL ONCE
Status: COMPLETED | OUTPATIENT
Start: 2023-05-07 | End: 2023-05-07

## 2023-05-07 RX ORDER — LOSARTAN POTASSIUM 50 MG/1
100 TABLET ORAL DAILY
Status: DISCONTINUED | OUTPATIENT
Start: 2023-05-08 | End: 2023-05-07 | Stop reason: HOSPADM

## 2023-05-07 RX ORDER — VALACYCLOVIR HYDROCHLORIDE 1 G/1
1000 TABLET, FILM COATED ORAL 3 TIMES DAILY
Qty: 15 TABLET | Refills: 0 | Status: SHIPPED | OUTPATIENT
Start: 2023-05-07 | End: 2023-05-12

## 2023-05-07 RX ADMIN — LOSARTAN POTASSIUM 50 MG: 50 TABLET, FILM COATED ORAL at 10:00

## 2023-05-07 RX ADMIN — VALACYCLOVIR HYDROCHLORIDE 1000 MG: 1 TABLET, FILM COATED ORAL at 09:08

## 2023-05-07 RX ADMIN — AMPICILLIN SODIUM AND SULBACTAM SODIUM 3 G: 2; 1 INJECTION, POWDER, FOR SOLUTION INTRAMUSCULAR; INTRAVENOUS at 09:09

## 2023-05-07 RX ADMIN — ACETAMINOPHEN 650 MG: 325 TABLET ORAL at 13:00

## 2023-05-07 RX ADMIN — AMPICILLIN SODIUM AND SULBACTAM SODIUM 3 G: 2; 1 INJECTION, POWDER, FOR SOLUTION INTRAMUSCULAR; INTRAVENOUS at 03:31

## 2023-05-07 RX ADMIN — HYDROCODONE BITARTRATE AND ACETAMINOPHEN 2 TABLET: 5; 325 TABLET ORAL at 07:52

## 2023-05-07 RX ADMIN — PROCHLORPERAZINE EDISYLATE 5 MG: 5 INJECTION INTRAMUSCULAR; INTRAVENOUS at 13:00

## 2023-05-07 RX ADMIN — LOSARTAN POTASSIUM 50 MG: 50 TABLET, FILM COATED ORAL at 07:52

## 2023-05-07 RX ADMIN — HYDROCODONE BITARTRATE AND ACETAMINOPHEN 2 TABLET: 5; 325 TABLET ORAL at 03:29

## 2023-05-07 RX ADMIN — VALACYCLOVIR HYDROCHLORIDE 1000 MG: 1 TABLET, FILM COATED ORAL at 14:34

## 2023-05-07 ASSESSMENT — ACTIVITIES OF DAILY LIVING (ADL)
ADLS_ACUITY_SCORE: 22

## 2023-05-07 NOTE — PROGRESS NOTES
[facility] MEDICINE PROGRESS NOTE      Summary: 53 year old male into [facility] on 5/2/2023 after presenting with  Arthralgias, fevers.  PMHx includes splenectomy for hereditary spherocytosis.  Pt's  Blood cultures were positive on first day of incubation.   Verigene panel was negative.  Today he has capnocytophaga confirmed.  Sensitivities pending.    Pt developed herpes zoster while here confusing the clinical picture due to   Right shoulder involvement.  He feels markedly improved after starting valacyclovir.  Blood cultures from 5/2/2023 grew capnocytophaga; he is on day 5 iv anbitiotics     Afebrile overnight.  I called micro today; plates are not read yet; he can be discharged  When the sensitivities finalize;     Hospital day number 5    Problem list:    1.  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  Herpes zoster: right C4 dermatome; confirmed with viral swab; valacyclovir   Started on 5/5/2023  3.  HTN: losartan/HCTZ  100-12.5  4.  History of splenectomy: vaccines per outpatient clinic  5.  dvt prevention:  ambulate        Alice Tim MD  [facility]  [facility]  Phone: #[phone]  Securely message with the Vocera Web Console (learn more here)  Text page via [employer] Paging/Directory     Interval History/Subjective:  See above; improved; afebrile      Physical Exam/Objective:  Temp:  [97.7  F (36.5  C)-98.1  F (36.7  C)] 97.7  F (36.5  C)  Pulse:  [55-61] 58  Resp:  [18-22] 18  BP: (151-163)/(87-94) 151/90  SpO2:  [94 %-97 %] 94 %  Body mass index is 34.81 kg/m .    GENERAL:  Alert, smiling   HEAD:  Normocephalic, without obvious abnormality, atraumatic   EYES:  PERRL, conjunctiva/corneas clear, no scleral icterus, EOM's intact   NOSE: Nares normal, septum midline, mucosa normal, no drainage   THROAT: Lips, mucosa, and tongue  normal; teeth and gums normal, mouth moist   NECK: Supple, symmetrical, trachea midline   BACK:   Symmetric, no curvature, ROM normal   LUNGS:   Clear to auscultation bilaterally, no rales, rhonchi, or wheezing, symmetric chest rise on inhalation, respirations unlabored   CHEST WALL:  No tenderness or deformity   HEART:  Regular rate and rhythm, S1 and S2 normal, no murmur, rub, or gallop    ABDOMEN:   Soft, non-tender, bowel sounds active all four quadrants, no masses, no organomegaly, no rebound or guarding   EXTREMITIES: Extremities normal, atraumatic, no cyanosis or edema    SKIN: Right C4 dermatome with zoster rash; more vesicular than yesterday   NEURO: Alert, oriented x3, moves all four extremities freely   PSYCH: Cooperative, behavior is appropriate      Data reviewed today: I personally reviewed all new medications, labs, imaging/diagnostics reports over the past 24 hours. Pertinent findings include:    Imaging:   No results found for this or any previous visit (from the past 24 hour(s)).    Labs:  Most Recent 3 BMP's:  Recent Labs   Lab Test 05/07/23  0759 05/06/23  0921 05/05/23  0828 05/04/23  0619 05/03/23  0716 05/02/23  1907   NA  --   --  139  --  136 132*   POTASSIUM 4.0 4.2 4.2 4.2 4.2 4.3   CHLORIDE  --   --  111*  --  109* 98   CO2  --   --  22  --  22 19*   BUN  --   --  16  --  26* 31*   CR  --   --  0.82 0.87 1.23 1.50*   ANIONGAP  --   --  6  --  5 15   MEHRAN  --   --  8.5  --  8.3* 9.2   GLC  --   --  105  --  98 105       Medications:   Personally Reviewed.  Medications     sodium chloride 75 mL/hr at 05/05/23 0014       ampicillin-sulbactam  3 g Intravenous Q6H     cefTRIAXone  2 g Intravenous Q24H     [START ON 5/8/2023] losartan  100 mg Oral Daily     losartan  50 mg Oral Once     sodium chloride (PF)  3 mL Intracatheter Q8H     valACYclovir  1,000 mg Oral TID

## 2023-05-07 NOTE — PLAN OF CARE
Problem: Plan of Care - These are the overarching goals to be used throughout the patient stay.    Goal: Optimal Comfort and Wellbeing  Outcome: Progressing   Goal Outcome Evaluation:       Pt is alert and oriented x4. Denies pain to right shoulder. Just developed a headache 3/10. PRN Tylenol administered. Removed IV from right AC because skin was red and hard. Swat Nurse replaced IV. IV  Unasyn administered,  Pt reported a lump to right neck, just below ear, could be swollen lymph nodes. Sticky note left to MD to check as per pt's request.   Independent in the room. VSS.

## 2023-05-07 NOTE — PLAN OF CARE
Problem: Pain Acute  Goal: Optimal Pain Control and Function  5/7/2023 0653 by Breann So, RN  Outcome: Progressing  5/7/2023 0650 by Breann So, RN  Outcome: Not Progressing  Intervention: Prevent or Manage Pain  Recent Flowsheet Documentation  Taken 5/7/2023 0329 by Breann So, RN  Medication Review/Management: medications reviewed       Patient had complaints of a headache around 0300 during shift. Patient stated that he received Tylenol earlier on evening shift and the Tylenol didn't help with the headache pain at all. Patient was then given PRN Norco which patient stated took care of the headache pain. Patient has also been having higher blood pressures, blood pressure on shift was 157/87, evening shift was also elevated at 160. Patient is concerned about increasing blood pressures. Given IV antibiotic on shift.

## 2023-05-07 NOTE — PROGRESS NOTES
"ORTHO PROGRESS NOTE    SUBJECTIVE: Jose Ramon is a 53-year-old male who is admitted for treatment of sepsis after a dog bite and development of a herpes zoster rash about the right shoulder.  This morning, he reports that his pain is \"about 55%\" improved from yesterday.  He has more motion with his right shoulder today and is feeling better overall.  No new concerns today.     OBJECTIVE:   Vitals:   Vitals:    05/06/23 0900 05/06/23 1704 05/06/23 2254 05/07/23 0336   BP: (!) 154/88 (!) 151/87 (!) 163/88 (!) 157/87   BP Location: Left arm Left arm Left arm Left arm   Patient Position: Sitting   Semi-Norton's   Cuff Size: Adult Regular      Pulse: 60 61 61 55   Resp: 16 18 22 18   Temp: 97.3  F (36.3  C) 98.1  F (36.7  C) 98.1  F (36.7  C)    TempSrc: Oral Oral Oral    SpO2: 98% 95% 97%    Weight:       Height:           Intake/Output Summary (Last 24 hours) at 5/7/2023 0715  Last data filed at 5/6/2023 1600  Gross per 24 hour   Intake 449 ml   Output --   Net 449 ml        Exam:  General: Patient is in no acute distress. Alert and oriented x3. Normal respiratory effort. There is a resolving pustular rash in the C4 distribution on the right side with papules crusted over.  Decreased erythema surrounding the papules. With respect to the right shoulder he has forward flexion to 120, abduction to 120, and 50 ER. Non-tender to palpation about the right shoulder. Sensation to light touch is intact distally    Labs:  Normalizing white count as of 5/5.  Positive herpes zoster. Therapeutic vancomycin level as of 5/5.     ASSESSMENT AND PLAN:    - Right shoulder pain and herpes zoster rash, improving with valacyclovir  -Continue PT/OT  -WBAT RUE  -ID following, appreciate their recommendations; continue antibiotics per ID  -Continue valacyclovir  -Normal diet      Discharge plan: Home pending medical clearance    Rico Betancourt PA-C      "

## 2023-05-07 NOTE — DISCHARGE SUMMARY
[facility] MEDICINE  DISCHARGE SUMMARY     Primary Care Physician: System, Provider Not In  Admission Date: 5/2/2023   Discharge Provider: Alice Tim MD Discharge Date: 5/7/2023   Diet:   Active Diet and Nourishment Order   Procedures     Regular Diet Adult     Diet       Code Status: Full Code   Activity: as tolerated        Condition at Discharge: improved     REASON FOR PRESENTATION(See Admission Note for Details)     Chills, bodyaches, arthralgias    PRINCIPAL & ACTIVE DISCHARGE DIAGNOSES     1.  Bodyaches due to sepsis  2.  Dogbite  3.  Bacteremia due to capnocytogapha   4.  C4 dermatome zoster; right   5.  History of splenectomy  6.  Low magnesium  7.  HTN      PENDING LABS     Unresulted Labs Ordered in the Past 30 Days of this Admission     Date and Time Order Name Status Description    5/4/2023  1:12 PM Blood Culture Peripheral Blood Preliminary     5/4/2023  1:12 PM Blood Culture Peripheral Blood Preliminary     5/4/2023 10:34 AM Blood Culture Peripheral Blood Preliminary     5/4/2023 10:34 AM Blood Culture Peripheral Blood Preliminary     5/2/2023  7:01 PM Blood Culture Peripheral Blood Preliminary             PROCEDURES ( this hospitalization only)      Aspiration of right shoulder joint space    RECOMMENDATIONS TO OUTPATIENT PROVIDER FOR F/U VISIT     Follow-up Appointments     Follow-up and recommended labs and tests       Follow up primary care for further discussion  Be sure you are up to date on your vaccines                 DISPOSITION     Home    SUMMARY OF HOSPITAL COURSE:      53 year old male into [facility] on 5/2/2023 after presenting with bodyaches, chills  And arthralgias; especially in the right shoulder and ankle. Pt is a  for [employer].  He  Had a small dogbite from a neighbors dog to the left middle finger 2 days prior to arrival.  Blood  Cultures were positive on the 1st day of incubation for gram negative bacilli.    On admission he had a  "leukocytosis of 21, CRP of 8.  He was afebrile and hemodynamically  Stable.  Pt was put on empiric antibiotics with zosyn and vancomycin.  A lactic acid was not done.   A tick relaed illness work up was done; it was negative. (including a peripheral smear).  Due to  His joint pains he was seen by orthopedics. There was concern about possible infection in   The  Right shoulder due to his pain, clinical presentation and history of prosthesis.  He  Had a \"dry tap\" by ortho on 5/3.  Fluid results were negative.  MRI of the right shoulder  Was negative on 5/4; no effusion nor evidence of edema or inflammation in the surrounding  Tissue.  On 5/5 he was continuing with significant amount of right shoulder pain that seemed  To have worsened while in the hospital.  He felt he did not arrive with the pain.  On 5/5  He developed a rash in the C4 dermatome that was suspicious for herpes zoster.  A  Viral swab was done.  He was started on valacyclovir. The swab was positive for herpes.  After 2 doses of valacyclovir his symptoms in the shoulder improved. He felt significantly  Better.  The shoulder regained its function.  (thought to be due to pain).  On 5/6 his  Blood cultures showed growth for capnocytophaga canimorsus.  Final sensitivities   Are pending at the time of discharge.  The ID team were involved from the beginning.    Pts hospital course was marked by the development of herpes zoster along with  The 2/2 blood culture positive for gram negative bacilli.  The verigene panel was  Negative.  Pts zoster rash improved and began to show slight drying of the lesions  On the day of discharge.      On 5/7/2023  ID decided to clear him for medical discharge.  The clinical plan is for  augmentin for a full 2 weeks.  He will receive valacyclovir for 7 days. He had  Some headaches on the day of discharge though no neck/back pain. I will watch for  Finalization of the cultures for tomorrow.   Questions were answered.  He is " aware  Updating all his vaccines is recommended.       Discharge Medications with Med changes:     Current Discharge Medication List      START taking these medications    Details   amoxicillin-clavulanate (AUGMENTIN) 875-125 MG tablet Take 1 tablet by mouth every 12 hours for 14 days  Qty: 28 tablet, Refills: 0    Associated Diagnoses: Herpes zoster without complication      prochlorperazine (COMPAZINE) 10 MG tablet Take 1 tablet (10 mg) by mouth every 6 hours as needed for nausea (headache)  Qty: 20 tablet, Refills: 0    Comments: Do not take during work hours  Associated Diagnoses: Tension headache      valACYclovir (VALTREX) 1000 mg tablet Take 1 tablet (1,000 mg) by mouth 3 times daily for 5 days  Qty: 15 tablet, Refills: 0    Associated Diagnoses: Herpes zoster without complication         CONTINUE these medications which have NOT CHANGED    Details   cetirizine (ZYRTEC) 10 MG tablet Take 10 mg by mouth daily      fluticasone (FLONASE) 50 MCG/ACT nasal spray Spray 2 sprays in nostril daily      losartan-hydrochlorothiazide (HYZAAR) 100-12.5 MG tablet Take 1 tablet by mouth daily      multivitamin, therapeutic (THERA-VIT) TABS tablet Take 1 tablet by mouth daily      zinc gluconate 50 MG tablet Take 50 mg by mouth daily                             Consults       PHARMACY TO DOSE VANCO  INFECTIOUS DISEASES IP CONSULT  INFECTIOUS DISEASES IP CONSULT  ORTHOPEDIC SURGERY IP CONSULT  CARE MANAGEMENT / SOCIAL WORK IP CONSULT    Immunizations given this encounter     Most Recent Immunizations   Administered Date(s) Administered     Flu, Unspecified 10/12/2018          SIGNIFICANT IMAGING FINDINGS     Results for orders placed or performed during the hospital encounter of 05/02/23   CT Chest/Abdomen/Pelvis w Contrast    Impression    IMPRESSION:  1.  No definite acute abnormality in the chest, abdomen or pelvis.  2.  Incidental 6 mm right lower lobe pulmonary nodule. Consider follow-up described below.  3.  Horseshoe  kidney noted. No hydronephrosis.    REFERENCE:  Guidelines for Management of Incidental Pulmonary Nodules Detected on CT Images: From the Fleischner Society 2017.   Guidelines apply to incidental nodules in patients who are 35 years or older.  Guidelines do not apply to lung cancer screening, patients with immunosuppression, or patients with known primary cancer.    SINGLE NODULE    Nodule size 6-8 mm  Low-risk patients: Follow-up CT at 6-12 months, then consider CT at 18-24 months.  High-risk patients: Follow-up CT at 6-12 months, then at 18-24 months if no change.    Consider referral to lung nodule clinic.     MR Lumbar Spine w/o & w Contrast    Impression    IMPRESSION:  1.  Good anatomic alignment and vertebral body heights maintained.  2.  No evidence of edema or abnormal enhancement.  3.  No significant canal compromise or neural foraminal narrowing throughout lumbar spine.   MR Thoracic Spine w/o & w Contrast    Impression    IMPRESSION:  1. Good anatomic alignment and vertebral body heights maintained.  2. No evidence of bone marrow edema or abnormal enhancement.  3. No significant canal compromise or significant neural foraminal narrowing throughout thoracic spine.    CT Head w/o Contrast    Impression    IMPRESSION:  1.  Normal head CT.   XR Ankle Right G/E 3 Views    Impression    IMPRESSION: There are postoperative changes from prior fixation of a medial malleolar fracture with 2 screws. No definite acute fracture is seen. Normal alignment. Ankle mortise is congruent.   XR Shoulder Right G/E 3 Views    Impression    IMPRESSION: There are postoperative changes from right shoulder arthroplasty, in standard alignment. No periprosthetic lucency or fracture is seen. There are mild degenerative changes at the right acromioclavicular joint.   US Joint Injection Aspiration Major Right    Impression    IMPRESSION:    1. Ultrasound-guided right glenohumeral joint aspiration.    2. No joint effusion present.  Sterile solution injected into the glenohumeral joint, though only a small amount of fluid was able to be aspirated.     MR Shoulder Right w/o & w Contrast    Impression    IMPRESSION:  1.  Right total shoulder arthroplasty. No joint effusion. No inflammatory change or abscess. No other gross abnormality within the limitations of artifact.   Echocardiogram Complete   Result Value Ref Range    LVEF  45-50%        SIGNIFICANT LABORATORY FINDINGS     Most Recent 3 BMP's:Recent Labs   Lab Test 05/07/23  0759 05/06/23  0921 05/05/23  0828 05/04/23  0619 05/03/23  0716 05/02/23  1907   NA  --   --  139  --  136 132*   POTASSIUM 4.0 4.2 4.2 4.2 4.2 4.3   CHLORIDE  --   --  111*  --  109* 98   CO2  --   --  22  --  22 19*   BUN  --   --  16  --  26* 31*   CR  --   --  0.82 0.87 1.23 1.50*   ANIONGAP  --   --  6  --  5 15   MEHRAN  --   --  8.5  --  8.3* 9.2   GLC  --   --  105  --  98 105           Discharge Orders        Reason for your hospital stay    Dog bite, bacteremia, zoster     Follow-up and recommended labs and tests     Follow up primary care for further discussion  Be sure you are up to date on your vaccines     Activity    As tolerated     Diet    Mediterranean heart healthy       Examination   Physical Exam   Temp:  [97.7  F (36.5  C)-98.1  F (36.7  C)] 97.7  F (36.5  C)  Pulse:  [55-61] 58  Resp:  [18-22] 18  BP: (151-163)/(87-94) 151/90  SpO2:  [94 %-97 %] 94 %  Wt Readings from Last 1 Encounters:   05/06/23 113.2 kg (249 lb 9.6 oz)       General Appearance: Awake, alert, no distress; mild headache; diffuse  Respiratory: clear bilaterally  Cardiovascular: regular  GI: soft  Skin: right C4 dermatome rash improved;   MSK:  Right shoulder ROM is improved       Please see EMR for more detailed significant labs, imaging, consultant notes etc.    IAlice MD, personally saw the patient today and spent less than or equal to 30 minutes discharging this patient.    Alice Tim MD  [facility]  [facility]    CC:System, Provider Not In

## 2023-05-07 NOTE — PLAN OF CARE
Patient c/o headache not alleviated by tylenol. He was given Norco on NOC and continued this on day shift. Dr. Tim suggested Compazine. Compazine given with Tylenol to good effect. Cleared for discharge by ID, patient will  go home with PO Augmentin and Valacyclovir. Shoulder ROM greatly improved, minimal pain. Shingles rash 95% crusted over. Education completed. Wife provided transport.

## 2023-05-09 ENCOUNTER — PATIENT OUTREACH (OUTPATIENT)
Dept: CARE COORDINATION | Facility: CLINIC | Age: 53
End: 2023-05-09
Payer: OTHER GOVERNMENT

## 2023-05-09 LAB
BACTERIA BLD CULT: ABNORMAL
BACTERIA BLD CULT: ABNORMAL
BACTERIA BLD CULT: NO GROWTH
BACTERIA BLD CULT: NO GROWTH

## 2023-05-09 NOTE — PROGRESS NOTES
"Clinic Care Coordination Contact  [facility]: Post-Discharge Note  SITUATION                                                      Admission:    Admission Date: 05/02/23   Reason for Admission: Sepsis  Discharge:   Discharge Date: 05/07/23  Discharge Diagnosis: Sepsis    BACKGROUND                                                      Per hospital discharge summary and inpatient provider notes:  53 year old male into [facility] on 5/2/2023 after presenting with bodyaches, chills  And arthralgias; especially in the right shoulder and ankle. Pt is a  for [employer].  He  Had a small dogbite from a neighbors dog to the left middle finger 2 days prior to arrival.  Blood  Cultures were positive on the 1st day of incubation for gram negative bacilli.     On admission he had a leukocytosis of 21, CRP of 8.  He was afebrile and hemodynamically  Stable.  Pt was put on empiric antibiotics with zosyn and vancomycin.  A lactic acid was not done.   A tick relaed illness work up was done; it was negative. (including a peripheral smear).  Due to  His joint pains he was seen by orthopedics. There was concern about possible infection in   The  Right shoulder due to his pain, clinical presentation and history of prosthesis.  He  Had a \"dry tap\" by ortho on 5/3.  Fluid results were negative.  MRI of the right shoulder  Was negative on 5/4; no effusion nor evidence of edema or inflammation in the surrounding  Tissue.  On 5/5 he was continuing with significant amount of right shoulder pain that seemed  To have worsened while in the hospital.  He felt he did not arrive with the pain.  On 5/5  He developed a rash in the C4 dermatome that was suspicious for herpes zoster.  A  Viral swab was done.  He was started on valacyclovir. The swab was positive for herpes.  After 2 doses of valacyclovir his symptoms in the shoulder improved. He felt significantly  Better.  The shoulder regained its function.  (thought to be due to pain).  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  Final sensitivities   Are pending at the time of discharge.  The ID team were involved from the beginning.    Pts hospital course was marked by the development of herpes zoster along with  The 2/2 blood culture positive for gram negative bacilli.  The verigene panel was  Negative.  Pts zoster rash improved and began to show slight drying of the lesions  On the day of discharge.       On 5/7/2023  ID decided to clear him for medical discharge.  The clinical plan is for  augmentin for a full 2 weeks.  He will receive valacyclovir for 7 days. He had  Some headaches on the day of discharge though no neck/back pain. I will watch for  Finalization of the cultures for tomorrow.   Questions were answered.  He is aware  Updating all his vaccines is recommended.    ASSESSMENT           Discharge Assessment  How are you doing now that you are home?: \" Doing good \"  How are your symptoms? (Red Flag symptoms escalate to triage hotline per guidelines): Improved  Do you feel your condition is stable enough to be safe at home until your provider visit?: Yes  Does the patient have their discharge instructions? : Yes  Does the patient have questions regarding their discharge instructions? : No  Were you started on any new medications or were there changes to any of your previous medications? : Yes  Does the patient have all of their medications?: Yes  Do you have questions regarding any of your medications? : No  Do you have all of your needed medical supplies or equipment (DME)?  (i.e. oxygen tank, CPAP, cane, etc.): Yes  Discharge follow-up appointment scheduled within 14 calendar days? : No  Is patient agreeable to assistance with scheduling? : No    Post-op ([facility] Only)  If the patient had a surgery or procedure, do they have any questions for a nurse?: No             PLAN                                                      Outpatient Plan:    Follow up primary " care for further discussion  Be sure you are up to date on your vaccines          Activity     As tolerated          Diet     Mediterranean heart healthy       No future appointments.      For any urgent concerns, please contact our 24 hour nurse triage line: [phone] ([phone])         Jillian Maier MA

## 2023-05-10 LAB
BACTERIA BLD CULT: NO GROWTH
BACTERIA BLD CULT: NO GROWTH

## 2024-03-02 ENCOUNTER — HEALTH MAINTENANCE LETTER (OUTPATIENT)
Age: 54
End: 2024-03-02

## 2025-03-15 ENCOUNTER — HEALTH MAINTENANCE LETTER (OUTPATIENT)
Age: 55
End: 2025-03-15